# Patient Record
Sex: FEMALE | Race: BLACK OR AFRICAN AMERICAN | Employment: FULL TIME | URBAN - METROPOLITAN AREA
[De-identification: names, ages, dates, MRNs, and addresses within clinical notes are randomized per-mention and may not be internally consistent; named-entity substitution may affect disease eponyms.]

---

## 2017-01-24 ENCOUNTER — OFFICE VISIT (OUTPATIENT)
Dept: INTERNAL MEDICINE CLINIC | Age: 57
End: 2017-01-24

## 2017-01-24 VITALS
OXYGEN SATURATION: 98 % | BODY MASS INDEX: 39.27 KG/M2 | HEIGHT: 67 IN | HEART RATE: 79 BPM | TEMPERATURE: 96 F | SYSTOLIC BLOOD PRESSURE: 143 MMHG | WEIGHT: 250.2 LBS | RESPIRATION RATE: 18 BRPM | DIASTOLIC BLOOD PRESSURE: 73 MMHG

## 2017-01-24 DIAGNOSIS — K62.5 RECTAL BLEEDING: Primary | ICD-10-CM

## 2017-01-24 DIAGNOSIS — E66.09 NON MORBID OBESITY DUE TO EXCESS CALORIES: ICD-10-CM

## 2017-01-24 DIAGNOSIS — I10 ESSENTIAL HYPERTENSION: ICD-10-CM

## 2017-01-24 NOTE — PROGRESS NOTES
SPORTS MEDICINE AND PRIMARY CARE  Leonard Medrano MD, [address] [address]  Phone:  [phone]  Fax: [phone]       Chief Complaint   Patient presents with    Hypertension   . SUBJECTIVE:    Sharifa Menon is a 64 y.o. female Patient returns today alert and appropriate and has the capacity to give an accurate history. She was seen at triage emergency room at [address] on 12/3/16 for rectal bleeding. She was then given an appointment to be seen by the GI clinic. Chemistries were performed and were unremarkable. Hemoglobin was 12.8, hematocrit was 38.5 and platelets were 844,990. PT/INR were normal.  A CT of the abdomen and pelvis was requested. The results are not available. Patient comes in today for follow-up. She has a known history of primary hypertension, colitis noted on colonoscopy 11 and referred to GI, rectal bleeding, and venous insufficiency. Patient shows me pictures of the amount of rectal bleeding. She has some clots as well as bright red bleeding. She feels fine and has no specific complaints except that she is concerned about the medical bills and no pathology is found. In  she did not see a GI specialist.             Current Outpatient Prescriptions   Medication Sig Dispense Refill    metoprolol succinate (TOPROL-XL) 25 mg XL tablet Take 1 Tab by mouth daily. 90 Tab 3    naproxen (NAPROSYN) 500 mg tablet Take 1 Tab by mouth two (2) times daily (with meals). 60 Tab 3    cetirizine (ZYRTEC) 10 mg tablet Take 1 Tab by mouth daily. 30 Tab 11    mometasone (NASONEX) 50 mcg/actuation nasal spray 2 Sprays by Both Nostrils route daily.  1 Container 11     Past Medical History   Diagnosis Date    Bereavement 14     80 yo mother  - aspiration    H/O colonoscopy 11     transversve colon    Hypertension     Obesity     Preventative health care     Rectal bleeding     Rotator cuff arthropathy     Venous insufficiency      History reviewed. No pertinent past surgical history. No Known Allergies      REVIEW OF SYSTEMS:  General: negative for - chills or fever  ENT: negative for - headaches, nasal congestion or tinnitus  Respiratory: negative for - cough, hemoptysis, shortness of breath or wheezing  Cardiovascular : negative for - chest pain, edema, palpitations or shortness of breath  Gastrointestinal: negative for - abdominal pain, blood in stools, heartburn or nausea/vomiting  Genito-Urinary: no dysuria, trouble voiding, or hematuria  Musculoskeletal: negative for - gait disturbance, joint pain, joint stiffness or joint swelling  Neurological: no TIA or stroke symptoms  Hematologic: no bruises, no bleeding, no swollen glands  Integument: no lumps, mole changes, nail changes or rash  Endocrine: no malaise/lethargy or unexpected weight changes      Social History     Social History    Marital status: SINGLE     Spouse name: N/A    Number of children: N/A    Years of education: N/A     Social History Main Topics    Smoking status: Never Smoker    Smokeless tobacco: Never Used    Alcohol use Yes    Drug use: No    Sexual activity: Not Asked     Other Topics Concern    None     Social History Narrative     Family History   Problem Relation Age of Onset    Diabetes Father     Hypertension Father     Stroke Father        OBJECTIVE:    Visit Vitals    /73 (BP 1 Location: Right arm, BP Patient Position: Sitting)    Pulse 79    Temp 96 °F (35.6 °C) (Oral)    Resp 18    Ht 5' 7\" (1.702 m)    Wt 250 lb 3.2 oz (113.5 kg)    SpO2 98%    BMI 39.19 kg/m2     CONSTITUTIONAL: well , well nourished, appears age appropriate  EYES: perrla, eom intact  ENMT:moist mucous membranes, pharynx clear  NECK: supple.  Thyroid normal  RESPIRATORY: Chest: clear bilaterally   CARDIOVASCULAR: Heart: regular rate and rhythm  GASTROINTESTINAL: Abdomen: soft, bowel sounds active  HEMATOLOGIC: no pathological lymph nodes palpated  MUSCULOSKELETAL: Extremities: no edema, pulse 1+   INTEGUMENT: No unusual rashes or suspicious skin lesions noted. Nails appear normal.  NEUROLOGIC: non-focal exam   MENTAL STATUS: alert and oriented, appropriate affect           ASSESSMENT:  1. Rectal bleeding    2. Essential hypertension    3. Non morbid obesity due to excess calories      I am concerned about the rectal bleeding. I suspect it is a flare of her colitis. I think she needs to be seen by gastroenterology for a colonoscopy and treatment of the colitis. She reluctantly agrees. Blood pressure is at the upper limits of normal.      BMI is 39.1 which represents morbid obesity and reflects an eight pound weight gain since we last saw her. I am sure this represents the holidays and she will make adjustments and 30 minutes five days a week activity level. She will return to see us in about six weeks, sooner if she has any further problems. PLAN:  .  Orders Placed This Encounter    REFERRAL TO GASTROENTEROLOGY       Follow-up Disposition:  Return in about 6 weeks (around 3/7/2017). ATTENTION:   This medical record was transcribed using an electronic medical records system. Although proofread, it may and can contain electronic and spelling errors. Other human spelling and other errors may be present. Corrections may be executed at a later time. Please feel free to contact us for any clarifications as needed.

## 2017-01-24 NOTE — PROGRESS NOTES
1. Have you been to the ER, urgent care clinic since your last visit? Hospitalized since your last visit? Yes Where: [facility]    2. Have you seen or consulted any other health care providers outside of the [address] [place] [address] since your last visit? Include any pap smears or colon screening.  Yes Reason for visit: blood in stool

## 2017-01-24 NOTE — MR AVS SNAPSHOT
Visit Information Date & Time Provider Department Dept. Phone Encounter #  
 1/24/2017  3:30 PM Emma Salter MD SPORTS MED AND PRIMARY CARE - [facility] [phone] [id number] Follow-up Instructions Return in about 6 weeks (around 3/7/2017). Follow-up and Disposition History Upcoming Health Maintenance Date Due  
 BREAST CANCER SCRN MAMMOGRAM 4/28/2017 FOBT Q 1 YEAR AGE 50-75 4/12/2017 PAP AKA CERVICAL CYTOLOGY 4/16/2018 DTaP/Tdap/Td series (2 - Td) 1/24/2027 Allergies as of 1/24/2017  Review Complete On: 1/24/2017 By: Emma Salter MD  
 No Known Allergies Current Immunizations  Never Reviewed No immunizations on file. Not reviewed this visit You Were Diagnosed With   
  
 Codes Comments Rectal bleeding    -  Primary ICD-10-CM: K62.5 ICD-9-CM: 569.3 Essential hypertension     ICD-10-CM: I10 
ICD-9-CM: 401.9 Non morbid obesity due to excess calories     ICD-10-CM: E66.09 
ICD-9-CM: 278.00 Vitals BP Pulse Temp Resp Height(growth percentile) Weight(growth percentile) 143/73 (BP 1 Location: Right arm, BP Patient Position: Sitting) 79 96 °F (35.6 °C) (Oral) 18 5' 7\" (1.702 m) 250 lb 3.2 oz (113.5 kg) SpO2 BMI OB Status Smoking Status 98% 39.19 kg/m2 Postmenopausal Never Smoker BMI and BSA Data Body Mass Index Body Surface Area  
 39.19 kg/m 2 2.32 m 2 Preferred Pharmacy Pharmacy Name Phone [id number] [place], [address] [phone] Your Updated Medication List  
  
   
This list is accurate as of: 1/24/17  4:06 PM.  Always use your most recent med list.  
  
  
  
  
 cetirizine 10 mg tablet Commonly known as:  ZYRTEC Take 1 Tab by mouth daily. metoprolol succinate 25 mg XL tablet Commonly known as:  TOPROL-XL Take 1 Tab by mouth daily. mometasone 50 mcg/actuation nasal spray Commonly known as:  NASONEX  
2 Sprays by Both Nostrils route daily. naproxen 500 mg tablet Commonly known as:  NAPROSYN Take 1 Tab by mouth two (2) times daily (with meals). We Performed the Following REFERRAL TO GASTROENTEROLOGY [ENF90 Custom] Follow-up Instructions Return in about 6 weeks (around 3/7/2017). Referral Information Referral ID Referred By Referred To  
  
 [id number] Shirley Barth MD   
   [facility], [address] Phone: [phone] Fax: [phone] Visits Status Start Date End Date 1 New Request 1/24/17 1/24/18 If your referral has a status of pending review or denied, additional information will be sent to support the outcome of this decision. Introducing [facility]! Teresa Gary introduces [employer] patient portal. Now you can access parts of your medical record, email your doctor's office, and request medication refills online. 1. In your internet browser, go to [email or web address] 2. Click on the First Time User? Click Here link in the Sign In box. You will see the New Member Sign Up page. 3. Enter your [employer] Access Code exactly as it appears below. You will not need to use this code after youve completed the sign-up process. If you do not sign up before the expiration date, you must request a new code. · [facility] Access Code: [email or web address] Expires: 4/24/2017  4:06 PM 
 
4. Enter the last four digits of your Social Security Number (xxxx) and Date of Birth (mm/dd/yyyy) as indicated and click Submit. You will be taken to the next sign-up page. 5. Create a [employer] ID. This will be your [facility] login ID and cannot be changed, so think of one that is secure and easy to remember. 6. Create a [employer] password. You can change your password at any time. 7. Enter your Password Reset Question and Answer.  This can be used at a later time if you forget your password. 8. Enter your e-mail address. You will receive e-mail notification when new information is available in [address] 9. Click Sign Up. You can now view and download portions of your medical record. 10. Click the Download Summary menu link to download a portable copy of your medical information. If you have questions, please visit the Frequently Asked Questions section of the [employer] website. Remember, [employer] is NOT to be used for urgent needs. For medical emergencies, dial 911. Now available from your iPhone and Android! Please provide this summary of care documentation to your next provider. Your primary care clinician is listed as Joey Guerra. If you have any questions after today's visit, please call [phone].

## 2017-01-25 ENCOUNTER — TELEPHONE (OUTPATIENT)
Dept: INTERNAL MEDICINE CLINIC | Age: 57
End: 2017-01-25

## 2017-03-23 ENCOUNTER — ANESTHESIA EVENT (OUTPATIENT)
Dept: SURGERY | Age: 57
End: 2017-03-23
Payer: COMMERCIAL

## 2017-03-28 ENCOUNTER — SURGERY (OUTPATIENT)
Age: 57
End: 2017-03-28

## 2017-03-28 ENCOUNTER — ANESTHESIA (OUTPATIENT)
Dept: SURGERY | Age: 57
End: 2017-03-28
Payer: COMMERCIAL

## 2017-03-28 ENCOUNTER — HOSPITAL ENCOUNTER (OUTPATIENT)
Age: 57
Setting detail: OUTPATIENT SURGERY
Discharge: HOME | End: 2017-03-28
Attending: INTERNAL MEDICINE | Admitting: INTERNAL MEDICINE
Payer: COMMERCIAL

## 2017-03-28 VITALS
HEIGHT: 67 IN | HEART RATE: 68 BPM | WEIGHT: 250 LBS | SYSTOLIC BLOOD PRESSURE: 135 MMHG | BODY MASS INDEX: 39.24 KG/M2 | DIASTOLIC BLOOD PRESSURE: 78 MMHG | RESPIRATION RATE: 16 BRPM | TEMPERATURE: 97.5 F | OXYGEN SATURATION: 98 %

## 2017-03-28 LAB
GLUCOSE BLD STRIP.AUTO-MCNC: 107 MG/DL (ref 65–100)
SERVICE CMNT-IMP: ABNORMAL

## 2017-03-28 PROCEDURE — 76060000031 HC ANESTHESIA FIRST 0.5 HR: Performed by: INTERNAL MEDICINE

## 2017-03-28 PROCEDURE — 82962 GLUCOSE BLOOD TEST: CPT

## 2017-03-28 PROCEDURE — 88342 IMHCHEM/IMCYTCHM 1ST ANTB: CPT | Performed by: INTERNAL MEDICINE

## 2017-03-28 PROCEDURE — 74011000250 HC RX REV CODE- 250

## 2017-03-28 PROCEDURE — 74011000250 HC RX REV CODE- 250: Performed by: INTERNAL MEDICINE

## 2017-03-28 PROCEDURE — 74011250636 HC RX REV CODE- 250/636

## 2017-03-28 PROCEDURE — 88305 TISSUE EXAM BY PATHOLOGIST: CPT | Performed by: INTERNAL MEDICINE

## 2017-03-28 PROCEDURE — 77030019988 HC FCPS ENDOSC DISP BSC -B: Performed by: INTERNAL MEDICINE

## 2017-03-28 PROCEDURE — 76040000019: Performed by: INTERNAL MEDICINE

## 2017-03-28 RX ORDER — NALOXONE HYDROCHLORIDE 0.4 MG/ML
0.4 INJECTION, SOLUTION INTRAMUSCULAR; INTRAVENOUS; SUBCUTANEOUS
Status: DISCONTINUED | OUTPATIENT
Start: 2017-03-28 | End: 2017-03-28 | Stop reason: HOSPADM

## 2017-03-28 RX ORDER — LIDOCAINE HYDROCHLORIDE 20 MG/ML
5 SOLUTION OROPHARYNGEAL AS NEEDED
Status: DISCONTINUED | OUTPATIENT
Start: 2017-03-28 | End: 2017-03-28 | Stop reason: HOSPADM

## 2017-03-28 RX ORDER — LIDOCAINE HYDROCHLORIDE 20 MG/ML
INJECTION, SOLUTION INFILTRATION; PERINEURAL AS NEEDED
Status: DISCONTINUED | OUTPATIENT
Start: 2017-03-28 | End: 2017-03-28 | Stop reason: HOSPADM

## 2017-03-28 RX ORDER — FENTANYL CITRATE 50 UG/ML
100 INJECTION, SOLUTION INTRAMUSCULAR; INTRAVENOUS ONCE
Status: DISCONTINUED | OUTPATIENT
Start: 2017-03-28 | End: 2017-03-28 | Stop reason: HOSPADM

## 2017-03-28 RX ORDER — DEXTROSE MONOHYDRATE AND SODIUM CHLORIDE 5; .9 G/100ML; G/100ML
100 INJECTION, SOLUTION INTRAVENOUS CONTINUOUS
Status: DISCONTINUED | OUTPATIENT
Start: 2017-03-28 | End: 2017-03-28 | Stop reason: HOSPADM

## 2017-03-28 RX ORDER — SODIUM CHLORIDE 0.9 % (FLUSH) 0.9 %
5-10 SYRINGE (ML) INJECTION AS NEEDED
Status: DISCONTINUED | OUTPATIENT
Start: 2017-03-28 | End: 2017-03-28 | Stop reason: HOSPADM

## 2017-03-28 RX ORDER — SODIUM CHLORIDE 0.9 % (FLUSH) 0.9 %
5-10 SYRINGE (ML) INJECTION EVERY 8 HOURS
Status: DISCONTINUED | OUTPATIENT
Start: 2017-03-28 | End: 2017-03-28 | Stop reason: HOSPADM

## 2017-03-28 RX ORDER — PROPOFOL 10 MG/ML
INJECTION, EMULSION INTRAVENOUS AS NEEDED
Status: DISCONTINUED | OUTPATIENT
Start: 2017-03-28 | End: 2017-03-28 | Stop reason: HOSPADM

## 2017-03-28 RX ORDER — SODIUM CHLORIDE, SODIUM LACTATE, POTASSIUM CHLORIDE, CALCIUM CHLORIDE 600; 310; 30; 20 MG/100ML; MG/100ML; MG/100ML; MG/100ML
INJECTION, SOLUTION INTRAVENOUS
Status: DISCONTINUED | OUTPATIENT
Start: 2017-03-28 | End: 2017-03-28 | Stop reason: HOSPADM

## 2017-03-28 RX ORDER — ATROPINE SULFATE 0.1 MG/ML
0.5 INJECTION INTRAVENOUS
Status: DISCONTINUED | OUTPATIENT
Start: 2017-03-28 | End: 2017-03-28 | Stop reason: HOSPADM

## 2017-03-28 RX ORDER — DIPHENHYDRAMINE HYDROCHLORIDE 50 MG/ML
50 INJECTION, SOLUTION INTRAMUSCULAR; INTRAVENOUS ONCE
Status: DISCONTINUED | OUTPATIENT
Start: 2017-03-28 | End: 2017-03-28 | Stop reason: HOSPADM

## 2017-03-28 RX ORDER — FLUMAZENIL 0.1 MG/ML
0.2 INJECTION INTRAVENOUS
Status: DISCONTINUED | OUTPATIENT
Start: 2017-03-28 | End: 2017-03-28 | Stop reason: HOSPADM

## 2017-03-28 RX ORDER — EPINEPHRINE 0.1 MG/ML
1 INJECTION INTRACARDIAC; INTRAVENOUS
Status: DISCONTINUED | OUTPATIENT
Start: 2017-03-28 | End: 2017-03-28 | Stop reason: HOSPADM

## 2017-03-28 RX ORDER — SODIUM CHLORIDE 9 MG/ML
100 INJECTION, SOLUTION INTRAVENOUS CONTINUOUS
Status: DISCONTINUED | OUTPATIENT
Start: 2017-03-28 | End: 2017-03-28 | Stop reason: HOSPADM

## 2017-03-28 RX ORDER — DEXTROMETHORPHAN/PSEUDOEPHED 2.5-7.5/.8
1.2 DROPS ORAL
Status: DISCONTINUED | OUTPATIENT
Start: 2017-03-28 | End: 2017-03-28 | Stop reason: HOSPADM

## 2017-03-28 RX ORDER — LORAZEPAM 2 MG/ML
2 INJECTION INTRAMUSCULAR AS NEEDED
Status: DISCONTINUED | OUTPATIENT
Start: 2017-03-28 | End: 2017-03-28 | Stop reason: HOSPADM

## 2017-03-28 RX ORDER — MIDAZOLAM HYDROCHLORIDE 1 MG/ML
5 INJECTION, SOLUTION INTRAMUSCULAR; INTRAVENOUS
Status: DISCONTINUED | OUTPATIENT
Start: 2017-03-28 | End: 2017-03-28 | Stop reason: HOSPADM

## 2017-03-28 RX ADMIN — SODIUM CHLORIDE, SODIUM LACTATE, POTASSIUM CHLORIDE, CALCIUM CHLORIDE: 600; 310; 30; 20 INJECTION, SOLUTION INTRAVENOUS at 09:00

## 2017-03-28 RX ADMIN — PROPOFOL 10 MG: 10 INJECTION, EMULSION INTRAVENOUS at 10:36

## 2017-03-28 RX ADMIN — PROPOFOL 20 MG: 10 INJECTION, EMULSION INTRAVENOUS at 10:33

## 2017-03-28 RX ADMIN — PROPOFOL 10 MG: 10 INJECTION, EMULSION INTRAVENOUS at 10:34

## 2017-03-28 RX ADMIN — BENZOCAINE 1 SPRAY: 200 SPRAY DENTAL; ORAL; PERIODONTAL at 10:29

## 2017-03-28 RX ADMIN — PROPOFOL 20 MG: 10 INJECTION, EMULSION INTRAVENOUS at 10:32

## 2017-03-28 RX ADMIN — PROPOFOL 10 MG: 10 INJECTION, EMULSION INTRAVENOUS at 10:35

## 2017-03-28 RX ADMIN — PROPOFOL 100 MG: 10 INJECTION, EMULSION INTRAVENOUS at 10:30

## 2017-03-28 RX ADMIN — PROPOFOL 10 MG: 10 INJECTION, EMULSION INTRAVENOUS at 10:39

## 2017-03-28 RX ADMIN — PROPOFOL 10 MG: 10 INJECTION, EMULSION INTRAVENOUS at 10:38

## 2017-03-28 RX ADMIN — PROPOFOL 10 MG: 10 INJECTION, EMULSION INTRAVENOUS at 10:37

## 2017-03-28 RX ADMIN — PROPOFOL 20 MG: 10 INJECTION, EMULSION INTRAVENOUS at 10:31

## 2017-03-28 RX ADMIN — LIDOCAINE HYDROCHLORIDE 60 MG: 20 INJECTION, SOLUTION INFILTRATION; PERINEURAL at 10:30

## 2017-03-28 RX ADMIN — PROPOFOL 10 MG: 10 INJECTION, EMULSION INTRAVENOUS at 10:40

## 2017-03-28 NOTE — PROCEDURES
G I Procedure Note                         EGD    Dr. Vania Farias office   [address]                              [id number]                                 [id number]   1960                       64 y.o.                female        Procedure Date: 3/28/2017   Procedure  EGD  with biopsy. Pre Op Diagnosis:                     1. GERD , ABDOMINAL PAIN   RECTAL BLEEDING                                                                                                                                                                           Post Op Diagnosis:                   1.   Mild Gastritis, Duodenal Ulcer, 3cm iatal Hernia,                                                            H&p completed  Yes    Anesthesia Assessment Performed prior to procedure:No change   Medications Medication Record            Description of Procedure:  Dolores Jewell  was seen in the endoscopy suite and the pre procedure evaluation was completed. The patient was identified as Dolores Jewell  and the procedure verified as EGD with biopsy. A Time Out was held and the above information confirmed. The risks, benefits, complications, treatment options and expected outcomes were discussed with the patient. The possibilities of reaction to medication, pulmonary aspiration, perforation of a viscus, bleeding, failure to diagnose a condition and creating a complication requiring transfusion or operation were discussed with the patient who  Permit obtained and risks explained ( [address])     Procedure Note:  With the patient in the left lateral position and after appropriate conscious anesthesia the Olympus Video endoscope was passed under direct vision into the oropharynx.   The oropharynx appeared Normal   The instrument was advanced into the esophagus and the findings were a normal appearing esophageal mucosa without other anatomic abnormalities. The instrument was advanced into the stomach through the esophagogastric junction. The gastroscope was advanced progressively through the stomach visualizing the body and antral areas. The findings were a moderate gastritis with erosions. A biopsy was obtained. The instrument was further advanced through the pylorus into the duodenum. The bulb of the duodenum and the second portion of the duodenum were examined and the findings were a linear  Superficial ulcer in the bulb and a smooth appearing duodenal mucosa with mild erythema. The endoscope was withdrawn back into the stomach and retroflexed with examination of the fundus and cardia and the findings were no additional abnormalities . The instrument was withdrawn back into the esophagus and the the finding were no additional abnormalities    Biopsies were obtained for helicobacter testing and pathologic analysis from the representative areas. The endoscope was completely withdrawn and the patient tolerated the procedure well. 1.  Blood loss was nominal.  2.  For biopsy  Specimen verification by physician and nurse two sources name,           social security numbers     Suggestions  Plan 1. - Acid suppression with a proton pump inhibitor.  - Await pathology. - Await DAMIAN test result and treat for Helicobacter pylori if positive. Angella Rizo MD                                                                                                                        G I Procedure Note              COLONOSCOPY   Dr. Ibrahima Copeland office   [facility] Office    [address] [place] [address] [address]                                   [id number]                                  [id number]   1960                                      64 y.o.                                    female Procedure Date: 3/28/2017                                                                                                              Pre Op Diagnosis:                     1. GERD , ABDOMINAL PAIN   RECTAL BLEEDING                                                                                                                                                                         Post Op Diagnosis:                   1.   Diverticulosis,   Hemorrhoids   Mild nonspecific edema                                                                H&p completed: Yes            Anesthesia Assessment: Performed prior to procedure:      No change  Anesthesia Plan: Performed prior to procedure:                   No change       Medications: See Reviewed List and Reconcilation           Informed consent was obtained     Risk Statement:  Prior to the procedure the risks were explained to the patient and/or to the family including but not limited to perforation, bleeding, adverse drug reaction, aspiration, and even the need for possible surgery. A colonoscopy exam is not 100% accurate which may be related to preparation or blind spots during the exam.The possibility that an abnormality and /or cancer could be missed was also discussed as well as alternative x-ray options. Instrument:    Olympus adult Videocolonoscope                                   Immediate Procedure Reassessment Completed     With the patient in the left lateral position, a rectal examination was performed and the findings were:negative, stool guaiac negative. The Olympus Video colonoscope was inserted under direct vision into the rectum. The colonoscope was passed from the rectum to the cecum, which was identified by the ileocecal valve. The colon findings demonstrated:    ANUS: Anal exam reveals no masses or hemorrhoids, sphincter tone is normal.     RECTUM: Rectal exam reveals no masses or hemorrhoids.      SIGMOID COLON: mild nonspecific edema The patient was noted to have diverticulosis. The mucosa is normal with good vascular pattern and without ulcers or polyps. DESCENDING COLON:  mild nonspecific edemaThe mucosa is normal with good vascular pattern and without ulcers or polyps. SPLENIC FLEXURE: The splenic flexure is normal.     TRANSVERSE COLON: The mucosa is normal with good vascular pattern and without ulcers or polyps. HEPATIC FLEXURE: The hepatic flexure is normal.     ASCENDING COLON: The mucosa is normal with good vascular pattern and without ulcers or polyps. CECUM:  The ileocecal valve appears normal.      rare diverticulosis was noted. A b     The colonoscope was slowly withdrawn >6 minute period and the instrument was retroflexed in the rectum. The rectal findings were:Protruding lesions:     -Internal Hemorrhoids  The patient tolerated the entire procedure well. Blood Loss minimal nominal    Colon preparation was good    Recommendations:     - Repeat colonoscopy in 5 years.      - recheck fe level , capsule endoscopy study, discuss with dr Manfred Negrete pcp        Discuss edema with attending md        Stools studies c diff , wbcs, c&s       Copies sent to   Hemant Villarreal MD  CC:  Candice Granados MD

## 2017-03-28 NOTE — IP AVS SNAPSHOT
Euell Idol 
 
 
 [address] Patient: Sohail Paz MRN: [id number] You are allergic to the following No active allergies Recent Documentation Height Weight Breastfeeding? BMI OB Status Smoking Status 1.702 m 113.4 kg No 39.16 kg/m2 Postmenopausal Never Smoker Emergency Contacts Name Discharge Info Relation Home Work Mobile Jennifer Silva CAREGIVER [3]  [phone] About your hospitalization You were admitted on:  March 28, 2017 You last received care in the:  [facility] PACU/[facility] You were discharged on:  March 28, 2017 Unit phone number:  [phone] Why you were hospitalized Your primary diagnosis was:  Not on File Providers Seen During Your Hospitalizations Provider Role Specialty Primary office phone Eulalia Membreno MD Attending Provider Internal Medicine [phone] Your Primary Care Physician (PCP) Primary Care Physician Office Phone Office Fax [place] Julissa [phone] Follow-up Information Follow up With Details Comments Contact Info Momo Hook MD   [facility] [address] [place] [address] 
[phone] Current Discharge Medication List  
  
CONTINUE these medications which have NOT CHANGED Dose & Instructions Dispensing Information Comments Morning Noon Evening Bedtime  
 cetirizine 10 mg tablet Commonly known as:  ZYRTEC Your last dose was: Your next dose is:    
   
   
 Dose:  10 mg Take 1 Tab by mouth daily. Quantity:  30 Tab Refills:  11  
     
   
   
   
  
 metoprolol succinate 25 mg XL tablet Commonly known as:  TOPROL-XL Your last dose was: Your next dose is:    
   
   
 Dose:  25 mg Take 1 Tab by mouth daily. Quantity:  90 Tab Refills:  3 mometasone 50 mcg/actuation nasal spray Commonly known as:  Fabricio Pelt Your last dose was: Your next dose is:    
   
   
 Dose:  2 Spray 2 Sprays by Both Nostrils route daily. Quantity:  1 Container Refills:  11 STOP taking these medications   
 naproxen 500 mg tablet Commonly known as:  NAPROSYN Discharge Instructions Endoscopy Discharge Instructions Dr. Bruno Goodell Powers office [phone] [facility] [place] Office     NAME: Sammi Ramirez RECORD [id number] PATIENT SS#[id number] YOB: 1960 SEX:  female AGE:  64 y.o. FINAL Discharge Procedure and Diagnosis:   
  
Procedure(s): 
COLONOSCOPY / EGD  
ESOPHAGOGASTRODUODENOSCOPY (EGD) FINDINGS:    
Ulcer Diverticulosis  
hemorrhoids MEDICATIONS CONTINUE CURRENT MEDICATIONS 
 
  NEW MEDICATIONS 1.  protonix 40 mg daily 2.  
 3.  
 
 
 
  
  
 
  
 
Testing Schedule Colonoscopy Screening                                   Recommendations Repeat colonoscopy in 3 years Repeat colonoscopy in 5 years Repeat colonoscopy in 10 years New additional 
Tests Call the office  
([phone]) for the appointment time    
  capsule endoscopy study after discussion with dr Eddie Abraham YOUR NEXT APPOINTMENT WITH DR Kyree Burrows: in  4 week(s).                Celeste Guidry MD  
                 
 
 
                     
                                     
   If you had a colonoscopy the \"C\" indicates specific instructions    
  
x  Ordinarily you may resume your previous diet but your initial diet should be   light. Large meals can cause abdominal discomfort after these procedures. Specific Diet Recommendations:  
    
      High fiber diet.,  
 
     GERD diet: avoid fried and fatty foods, peppermint, chocolate, alcohol,    
          coffee, citrus fruits and juices, and tomato products. Avoid lying down for 2           to 3  hours after eating.       
            
__x__  You may feel quite tired and need to rest and recuperate for several hours    following these procedures. __x__  Due to the fact that sedation was administered for this procedure, do not drive,   operate machinery or sign legal documents for the next 24 hours. __x__  Mild abdominal pain may be experienced after your procedure, but is should   disappear after several hours. Notify your physician if you have persistent pain,   tenderness or abdominal distension. __x__  C Many patients for the first few hours following the exam may experience        belching or passing gas through the rectum. Walking may help to relieve      
 distention and gas pains. A warm bath or shower will often help with abdominal  cramping.                                                                                        
  
__x__   Brian Friedman may return to your normal routine tomorrow, according to how you feel        and depending on your doctors instructions. Be sure to call your doctor to make  an appointment for a post-surgery check-up on the date your doctor has   requested. __x__ C Rectal bleeding or spotting in small amounts may occur with the first bowel   movement following a colonoscopy or sigmoidoscopy. __x__  Rondazana Alar may experience a numbness or lack of sensation in throat. If present, do not   
 eat or drink.  Before eating, test your ability to drink with small sips of water. Y     You may try clear liquids or soups. If you tolerate these, you may then eat solid     food which is not greasy or spicy. __x__ C   
 IF POLYPS REMOVED: Avoid any blood thinning medication such as plavix,   aspirin or coumadin  NSAIDS (like advil or alleve) for 7 days. __x__  Notify your physician if you cough or vomit blood or experience chest pain. Your biopsy or testing result should be available in 7-10 days Prescription will be electronically sent to your pharmacy you must  
  let your nurse know your pharmacy:  
                                                                                                                               
 
 
     [address] TO HELP ENSURE A SMOOTH RECOVERY,  
    IT IS IMPORTANT TO FOLLOW THEM. _x___Pamphlet /Educational Information provided for diagnostic findings Additional education information can assessed at the sites below: 
 [employer] 
 http://www.digestive. niddk.nih.gov/ddiseases/a-z.asp Web MD patient information Signature of individual giving instruction : 
 Date: 3/28/2017 Discharge Orders None Introducing [facility]! Emile Anderson introduces [employer] patient portal. Now you can access parts of your medical record, email your doctor's office, and request medication refills online. 1. In your internet browser, go to [email or web address]://[email or web address]. Aframe/[employer] 2. Click on the First Time User? Click Here link in the Sign In box. You will see the New Member Sign Up page. 3. Enter your [employer] Access Code exactly as it appears below. You will not need to use this code after youve completed the sign-up process. If you do not sign up before the expiration date, you must request a new code. · [email or web address] Access Code: [email or web address] Expires: 4/24/2017  5:06 PM 
 
4. Enter the last four digits of your Social Security Number (xxxx) and Date of Birth (mm/dd/yyyy) as indicated and click Submit. You will be taken to the next sign-up page. 5. Create a [employer] ID. This will be your [employer] login ID and cannot be changed, so think of one that is secure and easy to remember. 6. Create a [employer] password. You can change your password at any time. 7. Enter your Password Reset Question and Answer. This can be used at a later time if you forget your password. 8. Enter your e-mail address. You will receive e-mail notification when new information is available in [address] 9. Click Sign Up. You can now view and download portions of your medical record. 10. Click the Download Summary menu link to download a portable copy of your medical information. If you have questions, please visit the Frequently Asked Questions section of the [employer] website. Remember, [employer] is NOT to be used for urgent needs. For medical emergencies, dial 911. Now available from your iPhone and Android! General Information Please provide this summary of care documentation to your next provider. Patient Signature:  ____________________________________________________________ Date:  ____________________________________________________________  
  
Aloma Snellen Provider Signature:  ____________________________________________________________ Date:  ____________________________________________________________

## 2017-03-28 NOTE — ANESTHESIA PREPROCEDURE EVALUATION
Anesthetic History   No history of anesthetic complications            Review of Systems / Medical History  Patient summary reviewed and pertinent labs reviewed    Pulmonary  Within defined limits                 Neuro/Psych   Within defined limits           Cardiovascular    Hypertension              Exercise tolerance: >4 METS     GI/Hepatic/Renal  Within defined limits              Endo/Other        Morbid obesity     Other Findings              Physical Exam    Airway  Mallampati: II  TM Distance: 4 - 6 cm  Neck ROM: decreased range of motion   Mouth opening: Normal     Cardiovascular    Rhythm: regular  Rate: normal         Dental    Dentition: Lower dentition intact and Upper dentition intact     Pulmonary  Breath sounds clear to auscultation               Abdominal  GI exam deferred       Other Findings            Anesthetic Plan    ASA: 2  Anesthesia type: MAC            Anesthetic plan and risks discussed with: Patient

## 2017-03-28 NOTE — DISCHARGE INSTRUCTIONS
Endoscopy Discharge Instructions     Dr. Alexsandra Yates office   [facility]    [phone]                                        NAME: Robert Hay [id number]   PATIENT SS#[id number] YOB: 1960   SEX:  female AGE:  64 y.o. FINAL Discharge Procedure and Diagnosis:       Procedure(s):  COLONOSCOPY / EGD   ESOPHAGOGASTRODUODENOSCOPY (EGD)       FINDINGS:     Ulcer   Diverticulosis   hemorrhoids                                            MEDICATIONS    [x] CONTINUE CURRENT MEDICATIONS     [] NEW MEDICATIONS           1.  protonix 40 mg daily    2.    3.                      Testing   Schedule              Colonoscopy Screening                                   Recommendations   []  Repeat colonoscopy in 3 years   [x]  Repeat colonoscopy in 5 years   []  Repeat colonoscopy in 10 years      New additional  Tests  Call the office   ([phone]) for the appointment time      [x] capsule endoscopy study after discussion with dr Manfred Negrete     []      []                [address]: in  4 week(s). Dwayne Amaral MD                                                                                        If you had a colonoscopy the \"C\" indicates specific instructions        x  Ordinarily you may resume your previous diet but your initial diet should be   light. Large meals can cause abdominal discomfort after these procedures. Specific Diet Recommendations:             [x] High fiber diet.,         [x] GERD diet: avoid fried and fatty foods, peppermint, chocolate, alcohol,               coffee, citrus fruits and juices, and tomato products.  Avoid lying down for 2           to 3  hours after eating.                     __x__  You may feel quite tired and need to rest and recuperate for several hours    following these procedures. __x__  Due to the fact that sedation was administered for this procedure, do not drive,   operate machinery or sign legal documents for the next 24 hours. __x__  Mild abdominal pain may be experienced after your procedure, but is should   disappear after several hours. Notify your physician if you have persistent pain,   tenderness or abdominal distension. __x__  C    Many patients for the first few hours following the exam may experience        belching or passing gas through the rectum. Walking may help to relieve        distention and gas pains. A warm bath or shower will often help with abdominal  cramping.                                                                                            __x__   Delores Villagran may return to your normal routine tomorrow, according to how you feel        and depending on your doctors instructions. Be sure to call your doctor to make  an appointment for a post-surgery check-up on the date your doctor has   requested. __x__ C     Rectal bleeding or spotting in small amounts may occur with the first bowel   movement following a colonoscopy or sigmoidoscopy. __x__  Delores Villagran may experience a numbness or lack of sensation in throat. If present, do not     eat or drink. Before eating, test your ability to drink with small sips of water. Y     You may try clear liquids or soups. If you tolerate these, you may then eat solid     food which is not greasy or spicy. __x__ C     IF POLYPS REMOVED: Avoid any blood thinning medication such as plavix,   aspirin or coumadin  NSAIDS (like advil or alleve) for 7 days. __x__  Notify your physician if you cough or vomit blood or experience chest pain.            Your biopsy or testing result should be available in 7-10 days                                                                                                                        Prescription will be electronically sent to your pharmacy you must     let your nurse know your pharmacy:                                                                                                                                            [address] [place] [address]. TO HELP ENSURE A SMOOTH RECOVERY,       IT IS IMPORTANT TO FOLLOW THEM. _x___Pamphlet /Educational Information provided for diagnostic findings     Additional education information can assessed at the sites below:   [employer]   http://www.digestive. niddk.nih.gov/ddiseases/a-z.asp      Web MD patient information                                                                                                Signature of individual giving instruction :   Date: 3/28/2017

## 2017-03-28 NOTE — PERIOP NOTES
Pt educated on discharge instructions by Dr. Rachell Martins and RN staff. Pt verbalized understanding and provided with opportunity to ask questions. Pt given written materials.

## 2017-03-28 NOTE — IP AVS SNAPSHOT
Current Discharge Medication List  
  
CONTINUE these medications which have NOT CHANGED Dose & Instructions Dispensing Information Comments Morning Noon Evening Bedtime  
 cetirizine 10 mg tablet Commonly known as:  ZYRTEC Your last dose was: Your next dose is:    
   
   
 Dose:  10 mg Take 1 Tab by mouth daily. Quantity:  30 Tab Refills:  11  
     
   
   
   
  
 metoprolol succinate 25 mg XL tablet Commonly known as:  TOPROL-XL Your last dose was: Your next dose is:    
   
   
 Dose:  25 mg Take 1 Tab by mouth daily. Quantity:  90 Tab Refills:  3  
     
   
   
   
  
 mometasone 50 mcg/actuation nasal spray Commonly known as:  Lauren Garrick Your last dose was: Your next dose is:    
   
   
 Dose:  2 Spray 2 Sprays by Both Nostrils route daily. Quantity:  1 Container Refills:  11 STOP taking these medications   
 naproxen 500 mg tablet Commonly known as:  NAPROSYN

## 2017-03-28 NOTE — ANESTHESIA POSTPROCEDURE EVALUATION
Post-Anesthesia Evaluation and Assessment    Patient: Cm Arellano MRN: [id number]  SSN: [id number]    YOB: 1960  Age: 64 y.o. Sex: female       Cardiovascular Function/Vital Signs  Visit Vitals    /78    Pulse 68    Temp 36.4 °C (97.5 °F)    Resp 16    Ht 5' 7\" (1.702 m)    Wt 113.4 kg (250 lb)    SpO2 98%    Breastfeeding No    BMI 39.16 kg/m2       Patient is status post MAC anesthesia for Procedure(s):  COLONOSCOPY   ESOPHAGOGASTRODUODENOSCOPY (EGD)  ESOPHAGOGASTRODUODENAL (EGD) BIOPSY. Nausea/Vomiting: None    Postoperative hydration reviewed and adequate. Pain:  Pain Scale 1: Numeric (0 - 10) (03/28/17 1105)  Pain Intensity 1: 0 (03/28/17 1105)   Managed    Neurological Status:   Neuro (WDL): Within Defined Limits (03/28/17 1105)  Neuro  Neurologic State: Alert; Appropriate for age (03/28/17 1105)  LUE Motor Response: Purposeful (03/28/17 1105)  LLE Motor Response: Purposeful (03/28/17 1105)  RUE Motor Response: Purposeful (03/28/17 1105)  RLE Motor Response: Purposeful (03/28/17 1105)   At baseline    Mental Status and Level of Consciousness: Arousable    Pulmonary Status:   O2 Device: Room air (03/28/17 1105)   Adequate oxygenation and airway patent    Complications related to anesthesia: None    Post-anesthesia assessment completed.  No concerns    Signed By: Elizabeth Tim MD     March 28, 2017

## 2017-03-28 NOTE — H&P
G I Procedure Note           Endoscopy History and Physical               Dr. Alexander  [address] [place] [address] [id number]  [id number]    1960  64 y.o.  female      Date of Procedure:   Preoperative Diagnosis:       Procedure:    3/28/2017           GERD   ABDOMINAL PAIN   RECTAL BLEEDING                               Procedure(s):  COLONOSCOPY / EGD   ESOPHAGOGASTRODUODENOSCOPY (EGD)      Gastroenterologist:  Anesthesia:           Eulalia Membreno MD                               MAC            History and procedure indication:  Sohail Paz is a 64 y.o. BLACK OR  female who presents with: GERD   ABDOMINAL PAIN   RECTAL BLEEDING    including the additional history of Altered Bowel Habits, Gastrointestinal Bleeding and Nausea ,Rectal bleeding,Abdominal pain, epigastric,  Abdominal pain, LLQ      Past Medical History:   Diagnosis Date    Bereavement 14    80 yo mother  - aspiration    H/O colonoscopy 11    transversve colon    Hypertension     Obesity     Preventative health care     Rectal bleeding     Rotator cuff arthropathy     Venous insufficiency       Prior to Admission medications    Medication Sig Start Date End Date Taking? Authorizing Provider   metoprolol succinate (TOPROL-XL) 25 mg XL tablet Take 1 Tab by mouth daily. 16   Momo Hook MD   naproxen (NAPROSYN) 500 mg tablet Take 1 Tab by mouth two (2) times daily (with meals). 16   Momo Hook MD   cetirizine (ZYRTEC) 10 mg tablet Take 1 Tab by mouth daily. 16   Momo Hook MD   mometasone (NASONEX) 50 mcg/actuation nasal spray 2 Sprays by Both Nostrils route daily. 16   Momo Hook MD     No Known Allergies    No past surgical history on file.   Family History   Problem Relation Age of Onset    Diabetes Father    [facility] Hypertension Father     Stroke Father       Social History   Substance Use Topics    Smoking status: Never Smoker    Smokeless tobacco: Never Used    Alcohol use Yes                                                      PHYSICAL EXAM   There were no vitals taken for this visit. General appearance:  alert, well appearing, and in no distress  Mental status:  normal mood, behavior, speech, dress, motor activity and thought processes  Nose:      normal and patent, no erythema, discharge or polyps  Mouth:- mucous membranes moist, pharynx normal without lesions                  [x]  No Loose teeth      []    Loose teeth  Finger opening:  []1     []1.5    [] 2     [] 2.5     [x] 3      [] 3.5     [] 4   Mallampati:         [] Class 1     [x] Class 2    [] Class 3      [] Class 4      Neck - supple,      [x] Full ROM [] Decreased ROM  [] Short Neck no significant adenopathy    Chest - clear to auscultation, no wheezes, rales or rhonchi, symmetric air entry  Heart: normal rate, regular rhythm, normal S1, S2, no murmurs, rubs, clicks or gallops  Abdomen: abdomen soft, bowel sounds  [x] normal  [] increased  [] hypoactive                       [] no tenderness  [] epigastric tenderness  [] LLQ tenderness   [] RLQ tenderness                      No masses, organomegaly or guarding. Rectal exam: negative without mass, lesions or tenderness  Extremities: peripheral pulses normal, no pedal edema, no clubbing or cyanosis  Neurologic: Alert and oriented to person, place, and time; normal strength and tone.                          Normal symmetric reflexes  Normal gait:                                      Assessement:                                 Pre op dx:  GERD   ABDOMINAL PAIN   RECTAL BLEEDING    Additional medical problems list below   Patient Active Problem List   Diagnosis Code    Hypertension I10    Venous insufficiency I87.2    H/O colonoscopy Z98.890    Rectal bleeding K62.5    Obesity E66.9    Rotator cuff arthropathy M12.819    Preventative health care Z00.00                                                                                         This note documentation was performed prior to this planned procedure       after a history and physical was performed in the office. Date: 3 15 17                   Pre Procedure Evaluation (per anesthesia or per h&p)                                                Sedation/Assessment:                                                                                               Mallampati Classification                            []Class 1                    []Class 2                    [] Class 3                  [] Class 4                                              ASA classfication         []     Class I: Normally healthy         []     Class II: Patient with mild systemic disease (e.g. hypertension)         []     Class III: Patient with severe systemic disease (e.g. CHF), non-decompensated         []     Class IV: Patient with severe systemic disease, decompensated         []     Class V: Moribund patient, survival unlikely                     Plan:  [x]  Egd                                 [x] Colonoscopy                               [] with Moderate Sedation /Conscious Sedation                                 [x] MAC          Patient stable for planned procedure. See orders.      Marisa Hall MD

## 2017-08-10 ENCOUNTER — OFFICE VISIT (OUTPATIENT)
Dept: INTERNAL MEDICINE CLINIC | Age: 57
End: 2017-08-10

## 2017-08-10 VITALS
HEART RATE: 70 BPM | BODY MASS INDEX: 37.67 KG/M2 | TEMPERATURE: 97.4 F | OXYGEN SATURATION: 99 % | DIASTOLIC BLOOD PRESSURE: 75 MMHG | WEIGHT: 240 LBS | RESPIRATION RATE: 16 BRPM | HEIGHT: 67 IN | SYSTOLIC BLOOD PRESSURE: 143 MMHG

## 2017-08-10 DIAGNOSIS — Z98.890 S/P COLONOSCOPY: ICD-10-CM

## 2017-08-10 DIAGNOSIS — Z79.891 CHRONICALLY ON OPIATE THERAPY: ICD-10-CM

## 2017-08-10 DIAGNOSIS — I87.2 VENOUS INSUFFICIENCY: ICD-10-CM

## 2017-08-10 DIAGNOSIS — E66.09 NON MORBID OBESITY DUE TO EXCESS CALORIES: ICD-10-CM

## 2017-08-10 DIAGNOSIS — M12.812 ROTATOR CUFF ARTHROPATHY, LEFT: ICD-10-CM

## 2017-08-10 DIAGNOSIS — K26.9 DUODENAL ULCER: ICD-10-CM

## 2017-08-10 DIAGNOSIS — I10 ESSENTIAL HYPERTENSION: Primary | ICD-10-CM

## 2017-08-10 DIAGNOSIS — Z00.00 PREVENTATIVE HEALTH CARE: ICD-10-CM

## 2017-08-10 RX ORDER — TRAMADOL HYDROCHLORIDE 50 MG/1
50 TABLET ORAL
Qty: 60 TAB | Refills: 2 | Status: SHIPPED | OUTPATIENT
Start: 2017-08-10 | End: 2018-02-21 | Stop reason: SDUPTHER

## 2017-08-10 RX ORDER — METOPROLOL SUCCINATE 25 MG/1
25 TABLET, EXTENDED RELEASE ORAL DAILY
Qty: 90 TAB | Refills: 3 | Status: SHIPPED | OUTPATIENT
Start: 2017-08-10 | End: 2018-08-15 | Stop reason: SDUPTHER

## 2017-08-10 RX ORDER — LANSOPRAZOLE 30 MG/1
30 CAPSULE, DELAYED RELEASE ORAL
Qty: 90 CAP | Refills: 11 | Status: SHIPPED | OUTPATIENT
Start: 2017-08-10 | End: 2018-08-15 | Stop reason: SDUPTHER

## 2017-08-10 NOTE — MR AVS SNAPSHOT
Visit Information Date & Time Provider Department Dept. Phone Encounter #  
 8/10/2017  4:45 PM Ame Roldan MD [facility] - [facility] [phone] [id number] Follow-up Instructions Return in about 3 months (around 11/10/2017). Follow-up and Disposition History Upcoming Health Maintenance Date Due FOBT Q 1 YEAR AGE 50-75 4/12/2017 BREAST CANCER SCRN MAMMOGRAM 4/28/2017 INFLUENZA AGE 9 TO ADULT 8/1/2017 PAP AKA CERVICAL CYTOLOGY 4/16/2018 DTaP/Tdap/Td series (2 - Td) 1/24/2027 Allergies as of 8/10/2017  Review Complete On: 8/10/2017 By: Ame Roldan MD  
 No Known Allergies Current Immunizations  Never Reviewed No immunizations on file. Not reviewed this visit You Were Diagnosed With   
  
 Codes Comments Essential hypertension    -  Primary ICD-10-CM: I10 
ICD-9-CM: 401.9 Venous insufficiency     ICD-10-CM: I87.2 ICD-9-CM: 459.81 Rotator cuff arthropathy, left     ICD-10-CM: C03.904 ICD-9-CM: 716.81 Preventative health care     ICD-10-CM: Z00.00 ICD-9-CM: V70.0 Non morbid obesity due to excess calories     ICD-10-CM: E66.09 
ICD-9-CM: 278.00 Duodenal ulcer     ICD-10-CM: K26.9 ICD-9-CM: 532.90 S/P colonoscopy     ICD-10-CM: F72.434 ICD-9-CM: V45.89 Chronically on opiate therapy     ICD-10-CM: Z79.891 ICD-9-CM: V58.69 Vitals BP Pulse Temp Resp Height(growth percentile) Weight(growth percentile) 143/75 (BP 1 Location: Left arm, BP Patient Position: Sitting) 70 97.4 °F (36.3 °C) (Oral) 16 5' 7\" (1.702 m) 240 lb (108.9 kg) SpO2 BMI OB Status Smoking Status 99% 37.59 kg/m2 Postmenopausal Never Smoker BMI and BSA Data Body Mass Index Body Surface Area  
 37.59 kg/m 2 2.27 m 2 Preferred Pharmacy Pharmacy Name Phone [id number] [place], [address] [phone] Your Updated Medication List  
  
   
This list is accurate as of: 8/10/17  5:02 PM.  Always use your most recent med list.  
  
  
  
  
 cetirizine 10 mg tablet Commonly known as:  ZYRTEC Take 1 Tab by mouth daily. lansoprazole 30 mg capsule Commonly known as:  PREVACID Take 1 Cap by mouth Daily (before breakfast). metoprolol succinate 25 mg XL tablet Commonly known as:  TOPROL-XL Take 1 Tab by mouth daily. mometasone 50 mcg/actuation nasal spray Commonly known as:  NASONEX  
USE 2 SPRAYS BY BOTH NOSTRILS ROUTE DAILY. traMADol 50 mg tablet Commonly known as:  ULTRAM  
Take 1 Tab by mouth every six (6) hours as needed for Pain. Max Daily Amount: 200 mg. Prescriptions Printed Refills  
 traMADol (ULTRAM) 50 mg tablet 2 Sig: Take 1 Tab by mouth every six (6) hours as needed for Pain. Max Daily Amount: 200 mg. Class: Print Route: Oral  
  
Prescriptions Sent to Pharmacy Refills  
 metoprolol succinate (TOPROL-XL) 25 mg XL tablet 3 Sig: Take 1 Tab by mouth daily. Class: Normal  
 Pharmacy: [address], [address] Ph #: [phone] Route: Oral  
 lansoprazole (PREVACID) 30 mg capsule 11 Sig: Take 1 Cap by mouth Daily (before breakfast). Class: Normal  
 Pharmacy: [address], [address] Ph #: [phone] Route: Oral  
  
We Performed the Following CBC WITH AUTOMATED DIFF [86660 CPT(R)] HEMOGLOBIN A1C WITH EAG [92709 CPT(R)] LIPID PANEL [02376 CPT(R)] METABOLIC PANEL, COMPREHENSIVE [02859 CPT(R)] PAIN MGMT PANEL W/REFL, UR [[id number] Custom] IA COLLECTION VENOUS BLOOD,VENIPUNCTURE [id number] CPT(R)] TSH 3RD GENERATION [42045 CPT(R)] URINALYSIS W/ RFLX MICROSCOPIC [87302 CPT(R)] Follow-up Instructions Return in about 3 months (around 11/10/2017). Introducing [facility]! [facility] introduces Open Home Pro patient portal. Now you can access parts of your medical record, email your doctor's office, and request medication refills online. 1. In your internet browser, go to [email or web address] 2. Click on the First Time User? Click Here link in the Sign In box. You will see the New Member Sign Up page. 3. Enter your Open Home Pro Access Code exactly as it appears below. You will not need to use this code after youve completed the sign-up process. If you do not sign up before the expiration date, you must request a new code. · Open Home Pro Access Code: [email or web address] Expires: 11/8/2017  4:38 PM 
 
4. Enter the last four digits of your Social Security Number (xxxx) and Date of Birth (mm/dd/yyyy) as indicated and click Submit. You will be taken to the next sign-up page. 5. Create a Open Home Pro ID. This will be your Open Home Pro login ID and cannot be changed, so think of one that is secure and easy to remember. 6. Create a Open Home Pro password. You can change your password at any time. 7. Enter your Password Reset Question and Answer. This can be used at a later time if you forget your password. 8. Enter your e-mail address. You will receive e-mail notification when new information is available in [address] 9. Click Sign Up. You can now view and download portions of your medical record. 10. Click the Download Summary menu link to download a portable copy of your medical information. If you have questions, please visit the Frequently Asked Questions section of the Open Home Pro website. Remember, Open Home Pro is NOT to be used for urgent needs. For medical emergencies, dial 911. Now available from your iPhone and Android! Please provide this summary of care documentation to your next provider. Your primary care clinician is listed as Leigh Ann Garcia.  If you have any questions after today's visit, please call [phone].

## 2017-08-10 NOTE — PROGRESS NOTES
SPORTS MEDICINE [facility]ARE  Juan C Taylor MD, [place], [place] [address] [id number]  Phone:  [phone]  Fax: [phone]       Chief Complaint   Patient presents with    Follow-up     4 month visit    Shoulder Pain     left shoulder pain   . SUBJECTIVE:    Ramya Engel is a 64 y.o. female Patient returns today alert, appropriate, ambulatory, has the capacity to give an accurate history. She has a known history of primary hypertension, , obesity and comes in . Patient returns today continuing to have discomfort in her shoulder on the left. We injected it in the past with little relief. She had to stop the NSAID because of the duodenal ulcer seen by Dr. Terrie Toure in March. Patient denies other specific complaints and is seen for evaluation. Current Outpatient Prescriptions   Medication Sig Dispense Refill    metoprolol succinate (TOPROL-XL) 25 mg XL tablet Take 1 Tab by mouth daily. 90 Tab 3    lansoprazole (PREVACID) 30 mg capsule Take 1 Cap by mouth Daily (before breakfast). 90 Cap 11    traMADol (ULTRAM) 50 mg tablet Take 1 Tab by mouth every six (6) hours as needed for Pain. Max Daily Amount: 200 mg. 60 Tab 2    mometasone (NASONEX) 50 mcg/actuation nasal spray USE 2 SPRAYS BY BOTH NOSTRILS ROUTE DAILY. 1 Container 11    cetirizine (ZYRTEC) 10 mg tablet Take 1 Tab by mouth daily.  27 Tab 11     Past Medical History:   Diagnosis Date    Bereavement 14    78 yo mother  - aspiration    Chronically on opiate therapy     Duodenal ulcer 2017    md mojgan    H/O colonoscopy 11    transversve colon    Hypertension     Obesity     Preventative health care     Rectal bleeding     Rotator cuff arthropathy     S/P colonoscopy 2017    diverticulosis - md mojgan    Venous insufficiency      Past Surgical History:   Procedure Laterality Date    COLONOSCOPY N/A 3/28/2017    COLONOSCOPY  performed by Emely Greene MD at [place] No Known Allergies      REVIEW OF SYSTEMS:  General: negative for - chills or fever  ENT: negative for - headaches, nasal congestion or tinnitus  Respiratory: negative for - cough, hemoptysis, shortness of breath or wheezing  Cardiovascular : negative for - chest pain, edema, palpitations or shortness of breath  Gastrointestinal: negative for - abdominal pain, blood in stools, heartburn or nausea/vomiting  Genito-Urinary: no dysuria, trouble voiding, or hematuria  Musculoskeletal: negative for - gait disturbance, joint pain, joint stiffness or joint swelling  Neurological: no TIA or stroke symptoms  Hematologic: no bruises, no bleeding, no swollen glands  Integument: no lumps, mole changes, nail changes or rash  Endocrine: no malaise/lethargy or unexpected weight changes      Social History     Social History    Marital status: SINGLE     Spouse name: N/A    Number of children: N/A    Years of education: N/A     Social History Main Topics    Smoking status: Never Smoker    Smokeless tobacco: Never Used    Alcohol use Yes    Drug use: No    Sexual activity: Not Asked     Other Topics Concern    None     Social History Narrative    Medical History: 2011 CT abdomen and pelvis 4.5 simple cyst right adnexa otherw ise negative    Surgical History: colonoscopy to transverse colon and referred to gastroenterology 2011    Hospitalization/Major Diagnostic Procedure: Denies Past Hospitalization    Family History: Mother:  79 aspiration Father: deceasedSister(s): aliveBrother(s): aliveSon(s): alive1 brother(s) , 6 sister(s) . 1 son(s) . Social History: Alcohol Use Patient uses alcohol, Drinks per occasion: 1, Drinks per w [place]: 0. Smoking Status Patient is a never    smoker. Marital Status: Single. Lives w ith: alone. Occupation/W ork: employed full time -[employer] . Education/School: elementary    school 8th.  Worship: no.     Family History   Problem Relation Age of Onset    Diabetes Father     Hypertension Father     Stroke Father        OBJECTIVE:    Visit Vitals    /75 (BP 1 Location: Left arm, BP Patient Position: Sitting)    Pulse 70    Temp 97.4 °F (36.3 °C) (Oral)    Resp 16    Ht 5' 7\" (1.702 m)    Wt 240 lb (108.9 kg)    SpO2 99%    BMI 37.59 kg/m2     CONSTITUTIONAL: well , well nourished, appears age appropriate  EYES: perrla, eom intact  ENMT:moist mucous membranes, pharynx clear  NECK: supple. Thyroid normal  RESPIRATORY: Chest: clear bilaterally   CARDIOVASCULAR: Heart: regular rate and rhythm  GASTROINTESTINAL: Abdomen: soft, bowel sounds active  HEMATOLOGIC: no pathological lymph nodes palpated  MUSCULOSKELETAL: Extremities: no edema, pulse 1+   INTEGUMENT: No unusual rashes or suspicious skin lesions noted. Nails appear normal.  NEUROLOGIC: non-focal exam   MENTAL STATUS: alert and oriented, appropriate affect           ASSESSMENT:  1. Essential hypertension    2. Venous insufficiency    3. Rotator cuff arthropathy, left    4. Preventative health care    5. Non morbid obesity due to excess calories    6. Duodenal ulcer    7. S/P colonoscopy    8. Chronically on opiate therapy      For the shoulder discomfort we offered physical therapy, which she declines, orthopedic referral, which she declines. We cannot use NSAIDs and Tylenol is not adequate relief of the discomfort and therefore she is relegated to Tramadol. She agrees to sign the contract and its contents. She gives us a urine sample for drug screen. She'll return to see us in four to six months, sooner if needed. The pain pills she will come get every month, but has to give us a urine sample every three months. We will renew the Metoprolol, as well as the Prevacid for her  stomach  GERD. She has symptomatic GERD. She will return to the office in three to four months, sooner if needed.   We congratulated her on her weight loss and encouraged her to continue weight loss and continue physical activity 30 minutes five days a week. PLAN:  .  Orders Placed This Encounter    URINALYSIS W/ RFLX MICROSCOPIC    CBC WITH AUTOMATED DIFF    METABOLIC PANEL, COMPREHENSIVE    LIPID PANEL    TSH 3RD GENERATION    HEMOGLOBIN A1C WITH EAG    PAIN MGMT PANEL W/REFL, UR    metoprolol succinate (TOPROL-XL) 25 mg XL tablet    lansoprazole (PREVACID) 30 mg capsule    traMADol (ULTRAM) 50 mg tablet       Follow-up Disposition:  Return in about 3 months (around 11/10/2017). ATTENTION:   This medical record was transcribed using an electronic medical records system. Although proofread, it may and can contain electronic and spelling errors. Other human spelling and other errors may be present. Corrections may be executed at a later time. Please feel free to contact us for any clarifications as needed.

## 2017-08-10 NOTE — PROGRESS NOTES
1. Have you been to the ER, urgent care clinic since your last visit? Hospitalized since your last visit? No    2. Have you seen or consulted any other health care providers outside of the [address] [place] [address] since your last visit? Include any pap smears or colon screening.  No

## 2017-08-11 LAB
ALBUMIN SERPL-MCNC: 4.3 G/DL (ref 3.5–5.5)
ALBUMIN/GLOB SERPL: 1.1 {RATIO} (ref 1.2–2.2)
ALP SERPL-CCNC: 82 IU/L (ref 39–117)
ALT SERPL-CCNC: 16 IU/L (ref 0–32)
AMPHETAMINES UR QL SCN: NEGATIVE NG/ML
APPEARANCE UR: CLEAR
AST SERPL-CCNC: 18 IU/L (ref 0–40)
BARBITURATES UR QL SCN: NEGATIVE NG/ML
BASOPHILS # BLD AUTO: 0 X10E3/UL (ref 0–0.2)
BASOPHILS NFR BLD AUTO: 1 %
BENZODIAZ UR QL SCN: NEGATIVE NG/ML
BILIRUB SERPL-MCNC: 0.4 MG/DL (ref 0–1.2)
BILIRUB UR QL STRIP: NEGATIVE
BUN SERPL-MCNC: 13 MG/DL (ref 6–24)
BUN/CREAT SERPL: 13 (ref 9–23)
BZE UR QL SCN: NEGATIVE NG/ML
CALCIUM SERPL-MCNC: 9.6 MG/DL (ref 8.7–10.2)
CANNABINOIDS UR QL SCN: NEGATIVE NG/ML
CHLORIDE SERPL-SCNC: 100 MMOL/L (ref 96–106)
CHOLEST SERPL-MCNC: 211 MG/DL (ref 100–199)
CO2 SERPL-SCNC: 25 MMOL/L (ref 18–29)
COLOR UR: YELLOW
CREAT SERPL-MCNC: 1.04 MG/DL (ref 0.57–1)
CREAT UR-MCNC: 96.6 MG/DL (ref 20–300)
EOSINOPHIL # BLD AUTO: 0.3 X10E3/UL (ref 0–0.4)
EOSINOPHIL NFR BLD AUTO: 4 %
ERYTHROCYTE [DISTWIDTH] IN BLOOD BY AUTOMATED COUNT: 14.3 % (ref 12.3–15.4)
EST. AVERAGE GLUCOSE BLD GHB EST-MCNC: 117 MG/DL
FENTANYL+NORFENTANYL UR QL SCN: NEGATIVE PG/ML
GLOBULIN SER CALC-MCNC: 3.9 G/DL (ref 1.5–4.5)
GLUCOSE SERPL-MCNC: 85 MG/DL (ref 65–99)
GLUCOSE UR QL: NEGATIVE
HBA1C MFR BLD: 5.7 % (ref 4.8–5.6)
HCT VFR BLD AUTO: 39 % (ref 34–46.6)
HDLC SERPL-MCNC: 53 MG/DL
HGB BLD-MCNC: 12.7 G/DL (ref 11.1–15.9)
HGB UR QL STRIP: NEGATIVE
IMM GRANULOCYTES # BLD: 0 X10E3/UL (ref 0–0.1)
IMM GRANULOCYTES NFR BLD: 0 %
KETONES UR QL STRIP: NEGATIVE
LDLC SERPL CALC-MCNC: 129 MG/DL (ref 0–99)
LEUKOCYTE ESTERASE UR QL STRIP: NEGATIVE
LYMPHOCYTES # BLD AUTO: 2.8 X10E3/UL (ref 0.7–3.1)
LYMPHOCYTES NFR BLD AUTO: 45 %
MCH RBC QN AUTO: 28.2 PG (ref 26.6–33)
MCHC RBC AUTO-ENTMCNC: 32.6 G/DL (ref 31.5–35.7)
MCV RBC AUTO: 87 FL (ref 79–97)
MEPERIDINE UR QL: NEGATIVE NG/ML
METHADONE UR QL SCN: NEGATIVE NG/ML
MICRO URNS: NORMAL
MONOCYTES # BLD AUTO: 0.7 X10E3/UL (ref 0.1–0.9)
MONOCYTES NFR BLD AUTO: 11 %
NEUTROPHILS # BLD AUTO: 2.4 X10E3/UL (ref 1.4–7)
NEUTROPHILS NFR BLD AUTO: 39 %
NITRITE UR QL STRIP: NEGATIVE
OPIATES UR QL SCN: NEGATIVE NG/ML
OXYCODONE+OXYMORPHONE UR QL SCN: NEGATIVE NG/ML
PCP UR QL: NEGATIVE NG/ML
PH UR STRIP: 6 [PH] (ref 5–7.5)
PH UR: 5.7 [PH] (ref 4.5–8.9)
PLATELET # BLD AUTO: 259 X10E3/UL (ref 150–379)
PLEASE NOTE:, 733157: NORMAL
POTASSIUM SERPL-SCNC: 4.1 MMOL/L (ref 3.5–5.2)
PROPOXYPH UR QL SCN: NEGATIVE NG/ML
PROT SERPL-MCNC: 8.2 G/DL (ref 6–8.5)
PROT UR QL STRIP: NEGATIVE
RBC # BLD AUTO: 4.51 X10E6/UL (ref 3.77–5.28)
SODIUM SERPL-SCNC: 141 MMOL/L (ref 134–144)
SP GR UR: 1.01
SP GR UR: 1.01 (ref 1–1.03)
TRAMADOL UR QL SCN: NEGATIVE NG/ML
TRIGL SERPL-MCNC: 147 MG/DL (ref 0–149)
TSH SERPL DL<=0.005 MIU/L-ACNC: 2.23 UIU/ML (ref 0.45–4.5)
UROBILINOGEN UR STRIP-MCNC: 0.2 MG/DL (ref 0.2–1)
VLDLC SERPL CALC-MCNC: 29 MG/DL (ref 5–40)
WBC # BLD AUTO: 6.2 X10E3/UL (ref 3.4–10.8)

## 2018-02-21 ENCOUNTER — OFFICE VISIT (OUTPATIENT)
Dept: INTERNAL MEDICINE CLINIC | Age: 58
End: 2018-02-21

## 2018-02-21 VITALS
WEIGHT: 235.1 LBS | TEMPERATURE: 97.8 F | RESPIRATION RATE: 18 BRPM | SYSTOLIC BLOOD PRESSURE: 134 MMHG | HEART RATE: 72 BPM | HEIGHT: 67 IN | BODY MASS INDEX: 36.9 KG/M2 | OXYGEN SATURATION: 97 % | DIASTOLIC BLOOD PRESSURE: 83 MMHG

## 2018-02-21 DIAGNOSIS — K26.9 DUODENAL ULCER: ICD-10-CM

## 2018-02-21 DIAGNOSIS — I10 ESSENTIAL HYPERTENSION: ICD-10-CM

## 2018-02-21 DIAGNOSIS — I87.2 VENOUS INSUFFICIENCY: ICD-10-CM

## 2018-02-21 DIAGNOSIS — Z79.891 CHRONICALLY ON OPIATE THERAPY: ICD-10-CM

## 2018-02-21 DIAGNOSIS — M75.102 LEFT ROTATOR CUFF TEAR ARTHROPATHY: ICD-10-CM

## 2018-02-21 DIAGNOSIS — G89.29 CHRONIC MIDLINE LOW BACK PAIN WITHOUT SCIATICA: Primary | ICD-10-CM

## 2018-02-21 DIAGNOSIS — M12.812 LEFT ROTATOR CUFF TEAR ARTHROPATHY: ICD-10-CM

## 2018-02-21 DIAGNOSIS — M54.50 CHRONIC MIDLINE LOW BACK PAIN WITHOUT SCIATICA: Primary | ICD-10-CM

## 2018-02-21 RX ORDER — TRAMADOL HYDROCHLORIDE 50 MG/1
50 TABLET ORAL
Qty: 60 TAB | Refills: 2 | Status: SHIPPED | OUTPATIENT
Start: 2018-02-21 | End: 2018-04-24

## 2018-02-21 RX ORDER — METHOCARBAMOL 750 MG/1
750 TABLET, FILM COATED ORAL 4 TIMES DAILY
Qty: 100 TAB | Refills: 2 | Status: SHIPPED | OUTPATIENT
Start: 2018-02-21 | End: 2018-04-24 | Stop reason: SINTOL

## 2018-02-21 NOTE — PROGRESS NOTES
1. Have you been to the ER, urgent care clinic since your last visit? Hospitalized since your last visit? No    2. Have you seen or consulted any other health care providers outside of the [address] [place] [address] since your last visit? Include any pap smears or colon screening.  No     Back pain

## 2018-02-21 NOTE — PATIENT INSTRUCTIONS
BLAIR CANCHOLA     Body Mass Index: Care Instructions  Your Care Instructions    Body mass index (BMI) can help you see if your weight is raising your risk for health problems. It uses a formula to compare how much you weigh with how tall you are. · A BMI lower than 18.5 is considered underweight. · A BMI between 18.5 and 24.9 is considered healthy. · A BMI between 25 and 29.9 is considered overweight. A BMI of 30 or higher is considered obese. If your BMI is in the normal range, it means that you have a lower risk for weight-related health problems. If your BMI is in the overweight or obese range, you may be at increased risk for weight-related health problems, such as high blood pressure, heart disease, stroke, arthritis or joint pain, and diabetes. If your BMI is in the underweight range, you may be at increased risk for health problems such as fatigue, lower protection (immunity) against illness, muscle loss, bone loss, hair loss, and hormone problems. BMI is just one measure of your risk for weight-related health problems. You may be at higher risk for health problems if you are not active, you eat an unhealthy diet, or you drink too much alcohol or use tobacco products. Follow-up care is a key part of your treatment and safety. Be sure to make and go to all appointments, and call your doctor if you are having problems. It's also a good idea to know your test results and keep a list of the medicines you take. How can you care for yourself at home? · Practice healthy eating habits. This includes eating plenty of fruits, vegetables, whole grains, lean protein, and low-fat dairy. · If your doctor recommends it, get more exercise. Walking is a good choice. Bit by bit, increase the amount you walk every day. Try for at least 30 minutes on most days of the week. · Do not smoke. Smoking can increase your risk for health problems.  If you need help quitting, talk to your doctor about stop-smoking programs and medicines. These can increase your chances of quitting for good. · Limit alcohol to 2 drinks a day for men and 1 drink a day for women. Too much alcohol can cause health problems. If you have a BMI higher than 25  · Your doctor may do other tests to check your risk for weight-related health problems. This may include measuring the distance around your waist. A waist measurement of more than 40 inches in men or 35 inches in women can increase the risk of weight-related health problems. · Talk with your doctor about steps you can take to stay healthy or improve your health. You may need to make lifestyle changes to lose weight and stay healthy, such as changing your diet and getting regular exercise. If you have a BMI lower than 18.5  · Your doctor may do other tests to check your risk for health problems. · Talk with your doctor about steps you can take to stay healthy or improve your health. You may need to make lifestyle changes to gain or maintain weight and stay healthy, such as getting more healthy foods in your diet and doing exercises to build muscle. Where can you learn more? Go to http://mallory-john.info/. Enter S176 in the search box to learn more about \"Body Mass Index: Care Instructions. \"  Current as of: October 13, 2016  Content Version: 11.4  © 6082-6104 Healthwise, Incorporated. Care instructions adapted under license by [employer] (which disclaims liability or warranty for this information). If you have questions about a medical condition or this instruction, always ask your healthcare professional. [place] [address] any warranty or liability for your use of this information.
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
## 2018-02-21 NOTE — MR AVS SNAPSHOT
[address] [place] [address] [place] [id number] 
[phone] Patient: Darryn Ortiz MRN: [id number] KEX:[id number] Visit Information Date & Time Provider Department Dept. Phone Encounter #  
 2/21/2018 12:00 PM Lillian Galarza MD [facility] Sports Medicine and [place] [address] [id number] Follow-up Instructions Return in about 2 months (around 4/21/2018). Follow-up and Disposition History Upcoming Health Maintenance Date Due  
 BREAST CANCER SCRN MAMMOGRAM 4/28/2017 PAP AKA CERVICAL CYTOLOGY 4/16/2018 DTaP/Tdap/Td series (2 - Td) 1/24/2027 COLONOSCOPY 3/28/2027 Allergies as of 2/21/2018  Review Complete On: 2/21/2018 By: Lillian Galarza MD  
 No Known Allergies Current Immunizations  Never Reviewed No immunizations on file. Not reviewed this visit You Were Diagnosed With   
  
 Codes Comments Chronic midline low back pain without sciatica    -  Primary ICD-10-CM: M54.5, G89.29 ICD-9-CM: 724.2, 338.29 Duodenal ulcer     ICD-10-CM: K26.9 ICD-9-CM: 532.90 Chronically on opiate therapy     ICD-10-CM: Z79.891 ICD-9-CM: V58.69 Essential hypertension     ICD-10-CM: I10 
ICD-9-CM: 401.9 Venous insufficiency     ICD-10-CM: I87.2 ICD-9-CM: 459.81 Left rotator cuff tear arthropathy     ICD-10-CM: M12.812 ICD-9-CM: 716.81 Vitals BP Pulse Temp Resp Height(growth percentile) Weight(growth percentile) 134/83 72 97.8 °F (36.6 °C) (Oral) 18 5' 7\" (1.702 m) 235 lb 1.6 oz (106.6 kg) SpO2 BMI OB Status Smoking Status 97% 36.82 kg/m2 Postmenopausal Never Smoker Vitals History BMI and BSA Data Body Mass Index Body Surface Area  
 36.82 kg/m 2 2.24 m 2 Preferred Pharmacy Pharmacy Name Phone [id number] [place], [address] [phone] Your Updated Medication List  
  
   
This list is accurate as of 2/21/18  2:09 PM.  Always use your most recent med list.  
  
  
  
  
 cetirizine 10 mg tablet Commonly known as:  ZYRTEC Take 1 Tab by mouth daily. lansoprazole 30 mg capsule Commonly known as:  PREVACID Take 1 Cap by mouth Daily (before breakfast). methocarbamol 750 mg tablet Commonly known as:  ROBAXIN Take 1 Tab by mouth four (4) times daily. metoprolol succinate 25 mg XL tablet Commonly known as:  TOPROL-XL Take 1 Tab by mouth daily. mometasone 50 mcg/actuation nasal spray Commonly known as:  NASONEX  
USE 2 SPRAYS BY BOTH NOSTRILS ROUTE DAILY. traMADol 50 mg tablet Commonly known as:  ULTRAM  
Take 1 Tab by mouth every six (6) hours as needed for Pain. Max Daily Amount: 200 mg. Prescriptions Printed Refills  
 traMADol (ULTRAM) 50 mg tablet 2 Sig: Take 1 Tab by mouth every six (6) hours as needed for Pain. Max Daily Amount: 200 mg. Class: Print Route: Oral  
  
Prescriptions Sent to Pharmacy Refills  
 methocarbamol (ROBAXIN) 750 mg tablet 2 Sig: Take 1 Tab by mouth four (4) times daily. Class: Normal  
 Pharmacy: [facility], [address] Ph #: [phone] Route: Oral  
  
We Performed the Following PAIN MGMT PANEL W/REFL, UR [IBT49625 Custom] REFERRAL TO PHYSICAL THERAPY [EXQ97 Custom] Follow-up Instructions Return in about 2 months (around 4/21/2018). To-Do List   
 02/21/2018 Imaging:  XR SPINE LUMB 2 OR 3 V Referral Information Referral ID Referred By Referred To  
  
 [id number] aKri DAS Not Available Visits Status Start Date End Date 1 New Request 2/21/18 2/21/19 If your referral has a status of pending review or denied, additional information will be sent to support the outcome of this decision. Patient Instructions BLAIR [place]A Body Mass Index: Care Instructions Your Care Instructions Body mass index (BMI) can help you see if your weight is raising your risk for health problems. It uses a formula to compare how much you weigh with how tall you are. · A BMI lower than 18.5 is considered underweight. · A BMI between 18.5 and 24.9 is considered healthy. · A BMI between 25 and 29.9 is considered overweight. A BMI of 30 or higher is considered obese. If your BMI is in the normal range, it means that you have a lower risk for weight-related health problems. If your BMI is in the overweight or obese range, you may be at increased risk for weight-related health problems, such as high blood pressure, heart disease, stroke, arthritis or joint pain, and diabetes. If your BMI is in the underweight range, you may be at increased risk for health problems such as fatigue, lower protection (immunity) against illness, muscle loss, bone loss, hair loss, and hormone problems. BMI is just one measure of your risk for weight-related health problems. You may be at higher risk for health problems if you are not active, you eat an unhealthy diet, or you drink too much alcohol or use tobacco products. Follow-up care is a key part of your treatment and safety. Be sure to make and go to all appointments, and call your doctor if you are having problems. It's also a good idea to know your test results and keep a list of the medicines you take. How can you care for yourself at home? · Practice healthy eating habits. This includes eating plenty of fruits, vegetables, whole grains, lean protein, and low-fat dairy. · If your doctor recommends it, get more exercise. Walking is a good choice. Bit by bit, increase the amount you walk every day. Try for at least 30 minutes on most days of the week. · Do not smoke. Smoking can increase your risk for health problems.  If you need help quitting, talk to your doctor about stop-smoking programs and medicines. These can increase your chances of quitting for good. · Limit alcohol to 2 drinks a day for men and 1 drink a day for women. Too much alcohol can cause health problems. If you have a BMI higher than 25 · Your doctor may do other tests to check your risk for weight-related health problems. This may include measuring the distance around your waist. A waist measurement of more than 40 inches in men or 35 inches in women can increase the risk of weight-related health problems. · Talk with your doctor about steps you can take to stay healthy or improve your health. You may need to make lifestyle changes to lose weight and stay healthy, such as changing your diet and getting regular exercise. If you have a BMI lower than 18.5 · Your doctor may do other tests to check your risk for health problems. · Talk with your doctor about steps you can take to stay healthy or improve your health. You may need to make lifestyle changes to gain or maintain weight and stay healthy, such as getting more healthy foods in your diet and doing exercises to build muscle. Where can you learn more? Go to http://mallory-john.info/. Enter S176 in the search box to learn more about \"Body Mass Index: Care Instructions. \" Current as of: October 13, 2016 Content Version: 11.4 © 3975-1884 Healthwise, Incorporated. Care instructions adapted under license by [employer] (which disclaims liability or warranty for this information). If you have questions about a medical condition or this instruction, always ask your healthcare professional. [place] [address] any warranty or liability for your use of this information. Patient Instructions History Introducing [facility] & HEALTH SERVICES!    
 [employer] [facility] introduces [employer] patient portal. Now you can access parts of your medical record, email your doctor's office, and request medication refills online. 1. In your internet browser, go to [email or web address] 2. Click on the First Time User? Click Here link in the Sign In box. You will see the New Member Sign Up page. 3. Enter your Rare Pink Access Code exactly as it appears below. You will not need to use this code after youve completed the sign-up process. If you do not sign up before the expiration date, you must request a new code. · Rare Pink Access Code: [email or web address] Expires: 5/22/2018  1:25 PM 
 
4. Enter the last four digits of your Social Security Number (xxxx) and Date of Birth (mm/dd/yyyy) as indicated and click Submit. You will be taken to the next sign-up page. 5. Create a Rare Pink ID. This will be your Rare Pink login ID and cannot be changed, so think of one that is secure and easy to remember. 6. Create a Rare Pink password. You can change your password at any time. 7. Enter your Password Reset Question and Answer. This can be used at a later time if you forget your password. 8. Enter your e-mail address. You will receive e-mail notification when new information is available in [address] 9. Click Sign Up. You can now view and download portions of your medical record. 10. Click the Download Summary menu link to download a portable copy of your medical information. If you have questions, please visit the Frequently Asked Questions section of the Rare Pink website. Remember, Rare Pink is NOT to be used for urgent needs. For medical emergencies, dial 911. Now available from your iPhone and Android! Please provide this summary of care documentation to your next provider. Your primary care clinician is listed as Stanley Pinto. If you have any questions after today's visit, please call [phone].

## 2018-02-27 LAB
AMPHETAMINES UR QL SCN: NEGATIVE NG/ML
BARBITURATES UR QL SCN: NEGATIVE NG/ML
BENZODIAZ UR QL SCN: NEGATIVE NG/ML
BZE UR QL SCN: NEGATIVE NG/ML
CANNABINOIDS UR QL SCN: NEGATIVE NG/ML
CREAT UR-MCNC: 91.6 MG/DL (ref 20–300)
FENTANYL+NORFENTANYL UR QL SCN: NEGATIVE PG/ML
MEPERIDINE UR QL: NEGATIVE NG/ML
METHADONE UR QL SCN: NEGATIVE NG/ML
OPIATES UR QL SCN: NEGATIVE NG/ML
OXYCODONE+OXYMORPHONE UR QL SCN: NEGATIVE NG/ML
PCP UR QL: NEGATIVE NG/ML
PH UR: 5.5 [PH] (ref 4.5–8.9)
PLEASE NOTE:, 733157: ABNORMAL
PROPOXYPH UR QL SCN: NEGATIVE NG/ML
SP GR UR: 1.02
TRAMADOL UR-MCNC: POSITIVE UG/ML

## 2018-04-24 ENCOUNTER — OFFICE VISIT (OUTPATIENT)
Dept: INTERNAL MEDICINE CLINIC | Age: 58
End: 2018-04-24

## 2018-04-24 VITALS
HEART RATE: 68 BPM | TEMPERATURE: 97.2 F | DIASTOLIC BLOOD PRESSURE: 72 MMHG | RESPIRATION RATE: 16 BRPM | HEIGHT: 67 IN | OXYGEN SATURATION: 96 % | SYSTOLIC BLOOD PRESSURE: 143 MMHG | BODY MASS INDEX: 37.01 KG/M2 | WEIGHT: 235.8 LBS

## 2018-04-24 DIAGNOSIS — M12.812 ROTATOR CUFF ARTHROPATHY OF LEFT SHOULDER: ICD-10-CM

## 2018-04-24 DIAGNOSIS — I10 ESSENTIAL HYPERTENSION: Primary | ICD-10-CM

## 2018-04-24 DIAGNOSIS — J34.89 RHINORRHEA: ICD-10-CM

## 2018-04-24 DIAGNOSIS — M54.50 CHRONIC MIDLINE LOW BACK PAIN WITHOUT SCIATICA: ICD-10-CM

## 2018-04-24 DIAGNOSIS — G89.29 CHRONIC MIDLINE LOW BACK PAIN WITHOUT SCIATICA: ICD-10-CM

## 2018-04-24 RX ORDER — MOMETASONE FUROATE 50 UG/1
2 SPRAY, METERED NASAL DAILY
Qty: 1 CONTAINER | Refills: 11 | Status: SHIPPED | OUTPATIENT
Start: 2018-04-24 | End: 2018-08-15

## 2018-04-24 NOTE — PROGRESS NOTES
SPORTS MEDICINE AND PRIMARY CARE  Paola Rolle MD, [address] [address]  Phone:  [phone]  Fax: [phone]       Chief Complaint   Patient presents with    Hypertension   . SUBJECTIVE:    Rell Herman is a 62 y.o. female Patient returns today with known history of rhinitis, hypertension, duodenal ulcer, chronic pain, on Tramadol, and is seen for evaluation. She has been taking the pain medicine for low back pain, but it doesn't seem to be effective. Therefore she stopped it and started taking Tylenol, which seems to be just as effective, and is seen for evaluation. Current Outpatient Prescriptions   Medication Sig Dispense Refill    mometasone (NASONEX) 50 mcg/actuation nasal spray 2 Sprays by Both Nostrils route daily. 1 Container 11    metoprolol succinate (TOPROL-XL) 25 mg XL tablet Take 1 Tab by mouth daily. 90 Tab 3    lansoprazole (PREVACID) 30 mg capsule Take 1 Cap by mouth Daily (before breakfast). 90 Cap 11    cetirizine (ZYRTEC) 10 mg tablet Take 1 Tab by mouth daily.  27 Tab 11     Past Medical History:   Diagnosis Date    Bereavement 14    80 yo mother  - aspiration    Chronically on opiate therapy     Duodenal ulcer 2017    md mojgan    H/O colonoscopy 11    transversve colon    Hypertension     Low back pain     Obesity     Preventative health care     Rectal bleeding     Rhinorrhea     Rotator cuff arthropathy     S/P colonoscopy 2017    diverticulosis - md mojgan    Venous insufficiency      Past Surgical History:   Procedure Laterality Date    COLONOSCOPY N/A 3/28/2017    COLONOSCOPY  performed by Sukh Bean MD at [place]     No Known Allergies      REVIEW OF SYSTEMS:  General: negative for - chills or fever  ENT: negative for - headaches, nasal congestion or tinnitus  Respiratory: negative for - cough, hemoptysis, shortness of breath or wheezing  Cardiovascular : negative for - chest pain, edema, palpitations or shortness of breath  Gastrointestinal: negative for - abdominal pain, blood in stools, heartburn or nausea/vomiting  Genito-Urinary: no dysuria, trouble voiding, or hematuria  Musculoskeletal: negative for - gait disturbance, joint pain, joint stiffness or joint swelling  Neurological: no TIA or stroke symptoms  Hematologic: no bruises, no bleeding, no swollen glands  Integument: no lumps, mole changes, nail changes or rash  Endocrine: no malaise/lethargy or unexpected weight changes      Social History     Social History    Marital status: SINGLE     Spouse name: N/A    Number of children: N/A    Years of education: N/A     Social History Main Topics    Smoking status: Never Smoker    Smokeless tobacco: Never Used    Alcohol use Yes      Comment: everyday    Drug use: No    Sexual activity: Not Currently     Other Topics Concern    None     Social History Narrative    Medical History: 2011 CT abdomen and pelvis 4.5 simple cyst right adnexa otherw ise negative    Surgical History: colonoscopy to transverse colon and referred to gastroenterology 2011    Hospitalization/Major Diagnostic Procedure: Denies Past Hospitalization    Family History: Mother:  79 aspiration Father: deceasedSister(s): aliveBrother(s): aliveSon(s): alive1 brother(s) , 6 sister(s) . 1 son(s) . Social History: Alcohol Use Patient uses alcohol, Drinks per occasion: 1, Drinks per w Native: 0. Smoking Status Patient is a never    smoker. Marital Status: Single. Lives w ith: alone. Occupation/W ork: employed full time -[employer] . Education/School: elementary    school 8th.  Buddhist: no.     Family History   Problem Relation Age of Onset    Diabetes Father     Hypertension Father     Stroke Father        OBJECTIVE:    Visit Vitals    /72    Pulse 68    Temp 97.2 °F (36.2 °C) (Oral)    Resp 16    Ht 5' 7\" (1.702 m)    Wt 235 lb 12.8 oz (107 kg)    SpO2 96%    BMI 36.93 kg/m2     CONSTITUTIONAL: well , well nourished, appears age appropriate  EYES: perrla, eom intact  ENMT:moist mucous membranes, pharynx clear  NECK: supple. Thyroid normal  RESPIRATORY: Chest: clear bilaterally   CARDIOVASCULAR: Heart: regular rate and rhythm  GASTROINTESTINAL: Abdomen: soft, bowel sounds active  HEMATOLOGIC: no pathological lymph nodes palpated  MUSCULOSKELETAL: Extremities: no edema, pulse 1+   INTEGUMENT: No unusual rashes or suspicious skin lesions noted. Nails appear normal.  NEUROLOGIC: non-focal exam   MENTAL STATUS: alert and oriented, appropriate affect           ASSESSMENT:  1. Essential hypertension    2. Chronic midline low back pain without sciatica    3. Rhinorrhea    4. Rotator cuff arthropathy of left shoulder      Tramadol not effective. She is using Tylenol, which we don't disagree, and it's just as effective if not better she states. She has year round rhinorrhea, is using Flonase. Will let her try Nasonex and see if that will be a little more effective. She is taking Zyrtec. We suggest she use Sudafed sparingly, advising her that it can run her blood pressure elevated. She works as a  and it is embarrassing to have rhinorrhea. Her weight remains the same. We certainly encourage physical activity and a heart healthy, weight reducing diet. She'll return to the office in about three or four months, sooner if she has any problems. She'll check her blood pressure to confirm it's staying in the normotensive range. PLAN:  .  Orders Placed This Encounter    WILL MAMMO BI SCREENING INCL CAD    mometasone (NASONEX) 50 mcg/actuation nasal spray       Follow-up Disposition:  Return in about 4 months (around 8/24/2018). ATTENTION:   This medical record was transcribed using an electronic medical records system. Although proofread, it may and can contain electronic and spelling errors.   Other human spelling and other errors may be present. Corrections may be executed at a later time. Please feel free to contact us for any clarifications as needed.

## 2018-04-24 NOTE — PROGRESS NOTES
1. Have you been to the ER, urgent care clinic since your last visit? Hospitalized since your last visit? No    2. Have you seen or consulted any other health care providers outside of the [address] [place] [address] since your last visit? Include any pap smears or colon screening.  No

## 2018-04-24 NOTE — MR AVS SNAPSHOT
[place] [address] 
[phone] Patient: Eliseo Carlson MRN: [id number] VS Visit Information Date & Time Provider Department Dept. Phone Encounter #  
 2018  3:00 PM Gaby Yates MD SPORTS MED AND PRIMARY CARE [facility] [phone] [id number] Follow-up Instructions Return in about 4 months (around 2018). Upcoming Health Maintenance Date Due  
 BREAST CANCER SCRN MAMMOGRAM 2020 PAP AKA CERVICAL CYTOLOGY 2021 DTaP/Tdap/Td series (2 - Td) 2027 COLONOSCOPY 3/28/2027 Allergies as of 2018  Review Complete On: 2018 By: Gaby Yates MD  
 No Known Allergies Current Immunizations  Never Reviewed No immunizations on file. Not reviewed this visit Vitals BP Pulse Temp Resp Height(growth percentile) Weight(growth percentile) 143/72 68 97.2 °F (36.2 °C) (Oral) 16 5' 7\" (1.702 m) 235 lb 12.8 oz (107 kg) SpO2 BMI OB Status Smoking Status 96% 36.93 kg/m2 Postmenopausal Never Smoker BMI and BSA Data Body Mass Index Body Surface Area  
 36.93 kg/m 2 2.25 m 2 Preferred Pharmacy Pharmacy Name Phone [id number] [place], [address] [phone] Your Updated Medication List  
  
   
This list is accurate as of 18  4:20 PM.  Always use your most recent med list.  
  
  
  
  
 cetirizine 10 mg tablet Commonly known as:  ZYRTEC Take 1 Tab by mouth daily. lansoprazole 30 mg capsule Commonly known as:  PREVACID Take 1 Cap by mouth Daily (before breakfast). metoprolol succinate 25 mg XL tablet Commonly known as:  TOPROL-XL Take 1 Tab by mouth daily. mometasone 50 mcg/actuation nasal spray Commonly known as:  NASONEX  
USE 2 SPRAYS BY BOTH NOSTRILS ROUTE DAILY. Follow-up Instructions Return in about 4 months (around 8/24/2018). Introducing [facility] & HEALTH SERVICES! [employer] introduces meets patient portal. Now you can access parts of your medical record, email your doctor's office, and request medication refills online. 1. In your internet browser, go to [email or web address] 2. Click on the First Time User? Click Here link in the Sign In box. You will see the New Member Sign Up page. 3. Enter your meets Access Code exactly as it appears below. You will not need to use this code after youve completed the sign-up process. If you do not sign up before the expiration date, you must request a new code. · meets Access Code: [email or web address] Expires: 5/22/2018  2:25 PM 
 
4. Enter the last four digits of your Social Security Number (xxxx) and Date of Birth (mm/dd/yyyy) as indicated and click Submit. You will be taken to the next sign-up page. 5. Create a meets ID. This will be your meets login ID and cannot be changed, so think of one that is secure and easy to remember. 6. Create a meets password. You can change your password at any time. 7. Enter your Password Reset Question and Answer. This can be used at a later time if you forget your password. 8. Enter your e-mail address. You will receive e-mail notification when new information is available in [address] 9. Click Sign Up. You can now view and download portions of your medical record. 10. Click the Download Summary menu link to download a portable copy of your medical information. If you have questions, please visit the Frequently Asked Questions section of the meets website. Remember, meets is NOT to be used for urgent needs. For medical emergencies, dial 911. Now available from your iPhone and Android! Please provide this summary of care documentation to your next provider. Your primary care clinician is listed as Gamaliel Chavarria. If you have any questions after today's visit, please call [phone].

## 2018-08-15 ENCOUNTER — OFFICE VISIT (OUTPATIENT)
Dept: INTERNAL MEDICINE CLINIC | Age: 58
End: 2018-08-15

## 2018-08-15 VITALS
SYSTOLIC BLOOD PRESSURE: 130 MMHG | WEIGHT: 236.6 LBS | OXYGEN SATURATION: 97 % | DIASTOLIC BLOOD PRESSURE: 80 MMHG | BODY MASS INDEX: 37.13 KG/M2 | TEMPERATURE: 97.5 F | HEIGHT: 67 IN | RESPIRATION RATE: 18 BRPM | HEART RATE: 77 BPM

## 2018-08-15 DIAGNOSIS — M12.812 ROTATOR CUFF ARTHROPATHY OF LEFT SHOULDER: ICD-10-CM

## 2018-08-15 DIAGNOSIS — I10 ESSENTIAL HYPERTENSION: Primary | ICD-10-CM

## 2018-08-15 DIAGNOSIS — E66.01 SEVERE OBESITY (BMI 35.0-39.9): ICD-10-CM

## 2018-08-15 DIAGNOSIS — I87.2 VENOUS INSUFFICIENCY: ICD-10-CM

## 2018-08-15 RX ORDER — LANSOPRAZOLE 30 MG/1
30 CAPSULE, DELAYED RELEASE ORAL
Qty: 90 CAP | Refills: 11 | Status: SHIPPED | OUTPATIENT
Start: 2018-08-15 | End: 2019-11-11

## 2018-08-15 RX ORDER — LANSOPRAZOLE 30 MG/1
30 CAPSULE, DELAYED RELEASE ORAL
Qty: 90 CAP | Refills: 11 | Status: SHIPPED | OUTPATIENT
Start: 2018-08-15 | End: 2018-08-15 | Stop reason: SDUPTHER

## 2018-08-15 RX ORDER — METOPROLOL SUCCINATE 25 MG/1
25 TABLET, EXTENDED RELEASE ORAL DAILY
Qty: 90 TAB | Refills: 3 | Status: SHIPPED | OUTPATIENT
Start: 2018-08-15 | End: 2019-08-28 | Stop reason: SDUPTHER

## 2018-08-15 RX ORDER — METOPROLOL SUCCINATE 25 MG/1
25 TABLET, EXTENDED RELEASE ORAL DAILY
Qty: 90 TAB | Refills: 3 | Status: SHIPPED | OUTPATIENT
Start: 2018-08-15 | End: 2018-08-15 | Stop reason: SDUPTHER

## 2018-08-15 NOTE — MR AVS SNAPSHOT
[address] [place] [address]. [id number] 
[phone] Patient: Sharifa Menon MRN: [id number] YTC:70[birth year]23[id number] Visit Information Date & Time Provider Department Dept. Phone Encounter #  
 8/15/2018 12:30 PM Diana Malloy MD [facility] Sports Medicine and Primary Care [phone] [id number] Follow-up Instructions Return in about 6 months (around 2/15/2019). Follow-up and Disposition History Upcoming Health Maintenance Date Due Influenza Age 5 to Adult 9/15/2018* BREAST CANCER SCRN MAMMOGRAM 4/24/2020 PAP AKA CERVICAL CYTOLOGY 4/24/2021 DTaP/Tdap/Td series (2 - Td) 1/24/2027 COLONOSCOPY 3/28/2027 *Topic was postponed. The date shown is not the original due date. Allergies as of 8/15/2018  Review Complete On: 8/15/2018 By: Diana Malloy MD  
 No Known Allergies Current Immunizations  Never Reviewed No immunizations on file. Not reviewed this visit You Were Diagnosed With   
  
 Codes Comments Essential hypertension    -  Primary ICD-10-CM: I10 
ICD-9-CM: 401.9 Severe obesity (BMI 35.0-39.9) (HCC)     ICD-10-CM: E66.01 
ICD-9-CM: 278.01 Venous insufficiency     ICD-10-CM: I87.2 ICD-9-CM: 459.81 Rotator cuff arthropathy of left shoulder     ICD-10-CM: O61.576 ICD-9-CM: 716.81 Vitals BP Pulse Temp Resp Height(growth percentile) Weight(growth percentile) 130/80 77 97.5 °F (36.4 °C) (Oral) 18 5' 7\" (1.702 m) 236 lb 9.6 oz (107.3 kg) SpO2 BMI OB Status Smoking Status 97% 37.06 kg/m2 Postmenopausal Never Smoker Vitals History BMI and BSA Data Body Mass Index Body Surface Area 37.06 kg/m 2 2.25 m 2 Preferred Pharmacy Pharmacy Name Phone [facility]/PHARMACY #[id number] [phone] Your Updated Medication List  
  
   
 This list is accurate as of 8/15/18  2:07 PM.  Always use your most recent med list.  
  
  
  
  
 lansoprazole 30 mg capsule Commonly known as:  PREVACID Take 1 Cap by mouth Daily (before breakfast). metoprolol succinate 25 mg XL tablet Commonly known as:  TOPROL-XL Take 1 Tab by mouth daily. Prescriptions Sent to Pharmacy Refills  
 lansoprazole (PREVACID) 30 mg capsule 11 Sig: Take 1 Cap by mouth Daily (before breakfast). Class: Normal  
 Pharmacy: [facility]/pharmacy #[id number] Ph #: [phone] Route: Oral  
 metoprolol succinate (TOPROL-XL) 25 mg XL tablet 3 Sig: Take 1 Tab by mouth daily. Class: Normal  
 Pharmacy: [facility]/pharmacy #[id number] Ph #: [phone] Route: Oral  
  
We Performed the Following CBC WITH AUTOMATED DIFF [15137 CPT(R)] COLLECTION VENOUS BLOOD,VENIPUNCTURE [id number] CPT(R)] HEMOGLOBIN A1C WITH EAG [35157 CPT(R)] HEPATITIS PANEL, ACUTE [12561 CPT(R)] HIV 1/2 AG/AB, 4TH GENERATION,W RFLX CONFIRM [id number] CPT(R)] LIPID PANEL [79663 CPT(R)] METABOLIC PANEL, COMPREHENSIVE [24604 CPT(R)] RPR [96532 CPT(R)] TSH 3RD GENERATION [63903 CPT(R)] URINALYSIS W/ RFLX MICROSCOPIC [16601 CPT(R)] Follow-up Instructions Return in about 6 months (around 2/15/2019). To-Do List   
 08/16/2018 10:45 AM  
  Appointment with [facility] [place] WILL 1 at [address] [place] [address] ([phone]) Shower or bathe using soap and water. Do not use deodorant, powder, perfumes, or lotion the day of your exam.  If your prior mammograms were not performed at [facility] 6 please bring films with you or forward prior images 2 days before your procedure.   Check in at registration 15min before your appointment time unless you were instructed to do otherwise. A script is not necessary, but if you have one, please bring it on the day of the mammogram or have it faxed to the department. You are responsible for finding a method of transportation to your appointment. If you don't have transportation, please reschedule your appointment at least 24 hours in advance. [facility] [phone] [facility]  [phone] [facility] 19 PRETTY  [phone] [facility] [phone] [facility] 4865 [facility] [phone] Introducing [facility]! Jeri Hurley introduces Harimata patient portal. Now you can access parts of your medical record, email your doctor's office, and request medication refills online. 1. In your internet browser, go to [email or web address] 2. Click on the First Time User? Click Here link in the Sign In box. You will see the New Member Sign Up page. 3. Enter your Harimata Access Code exactly as it appears below. You will not need to use this code after youve completed the sign-up process. If you do not sign up before the expiration date, you must request a new code. · Harimata Access Code: [email or web address] Expires: 11/7/2018  4:15 PM 
 
4. Enter the last four digits of your Social Security Number (xxxx) and Date of Birth (mm/dd/yyyy) as indicated and click Submit. You will be taken to the next sign-up page. 5. Create a SirionLabst ID. This will be your Harimata login ID and cannot be changed, so think of one that is secure and easy to remember. 6. Create a Harimata password. You can change your password at any time. 7. Enter your Password Reset Question and Answer. This can be used at a later time if you forget your password. 8. Enter your e-mail address. You will receive e-mail notification when new information is available in [address] 9. Click Sign Up. You can now view and download portions of your medical record. 10. Click the Download Summary menu link to download a portable copy of your medical information. If you have questions, please visit the Frequently Asked Questions section of the Cogenic[employer] website. Remember, FortyCloud is NOT to be used for urgent needs. For medical emergencies, dial 911. Now available from your iPhone and Android! Please provide this summary of care documentation to your next provider. Your primary care clinician is listed as Junior Monahan. If you have any questions after today's visit, please call [phone].

## 2018-08-15 NOTE — PROGRESS NOTES
1. Have you been to the ER, urgent care clinic since your last visit? Hospitalized since your last visit? No    2. Have you seen or consulted any other health care providers outside of the [address] [place] [address] since your last visit? Include any pap smears or colon screening.  No     Wants to be tested for STD and pregnacy

## 2018-08-15 NOTE — PROGRESS NOTES
SPORTS MEDICINE AND PRIMARY CARE  Papi Caro MD, [address] [address]  Phone:  [phone]  Fax: [phone]       Chief Complaint   Patient presents with    Hypertension   . SUBJECTIVE:    Sohail Paz is a 62 y.o. female  Dictation on: 08/15/2018  1:31 PM by: Lydia Andrade      Dictation on: 08/15/2018  1:37 PM by: Lydia Andrade            Current Outpatient Prescriptions   Medication Sig Dispense Refill    lansoprazole (PREVACID) 30 mg capsule Take 1 Cap by mouth Daily (before breakfast). 90 Cap 11    metoprolol succinate (TOPROL-XL) 25 mg XL tablet Take 1 Tab by mouth daily.  80 Tab 3     Past Medical History:   Diagnosis Date    Bereavement 14    80 yo mother  - aspiration    Chronically on opiate therapy     Duodenal ulcer 2017    md mojgan    H/O colonoscopy 11    transversve colon    Hypertension     Low back pain     Obesity     Preventative health care     Rectal bleeding     Rhinorrhea     Rotator cuff arthropathy     S/P colonoscopy 2017    diverticulosis - md mojgan    Venous insufficiency      Past Surgical History:   Procedure Laterality Date    COLONOSCOPY N/A 3/28/2017    COLONOSCOPY  performed by Eulalia Membreno MD at [place]     No Known Allergies      REVIEW OF SYSTEMS:  General: negative for - chills or fever  ENT: negative for - headaches, nasal congestion or tinnitus  Respiratory: negative for - cough, hemoptysis, shortness of breath or wheezing  Cardiovascular : negative for - chest pain, edema, palpitations or shortness of breath  Gastrointestinal: negative for - abdominal pain, blood in stools, heartburn or nausea/vomiting  Genito-Urinary: no dysuria, trouble voiding, or hematuria  Musculoskeletal: negative for - gait disturbance, joint pain, joint stiffness or joint swelling  Neurological: no TIA or stroke symptoms  Hematologic: no bruises, no bleeding, no swollen glands  Integument: no lumps, mole changes, nail changes or rash  Endocrine: no malaise/lethargy or unexpected weight changes      Social History     Social History    Marital status: SINGLE     Spouse name: N/A    Number of children: N/A    Years of education: N/A     Social History Main Topics    Smoking status: Never Smoker    Smokeless tobacco: Never Used    Alcohol use Yes      Comment: everyday    Drug use: No    Sexual activity: Yes     Partners: Male     Birth control/ protection: None     Other Topics Concern    None     Social History Narrative    Medical History: 2011 CT abdomen and pelvis 4.5 simple cyst right adnexa otherw ise negative    Surgical History: colonoscopy to transverse colon and referred to gastroenterology 2011    Hospitalization/Major Diagnostic Procedure: Denies Past Hospitalization    Family History: Mother:  79 aspiration Father: deceasedSister(s): aliveBrother(s): aliveSon(s): alive1 brother(s) , 6 sister(s) . 1 son(s) . Social History: Alcohol Use Patient uses alcohol, Drinks per occasion: 1, Drinks per w [place]: 0. Smoking Status Patient is a never    smoker. Marital Status: Single. Lives w ith: alone. Occupation/W ork: employed full time -[employer]. Education/School: elementary    school 8th. Sabianist: no.     Family History   Problem Relation Age of Onset    Diabetes Father     Hypertension Father     Stroke Father        OBJECTIVE:    Visit Vitals    /80    Pulse 77    Temp 97.5 °F (36.4 °C) (Oral)    Resp 18    Ht 5' 7\" (1.702 m)    Wt 236 lb 9.6 oz (107.3 kg)    SpO2 97%    BMI 37.06 kg/m2     CONSTITUTIONAL: well , well nourished, appears age appropriate  EYES: perrla, eom intact  ENMT:moist mucous membranes, pharynx clear  NECK: supple.  Thyroid normal  RESPIRATORY: Chest: clear bilaterally   CARDIOVASCULAR: Heart: regular rate and rhythm  GASTROINTESTINAL: Abdomen: soft, bowel sounds active  HEMATOLOGIC: no pathological lymph nodes palpated  MUSCULOSKELETAL: Extremities: no edema, pulse 1+   INTEGUMENT: No unusual rashes or suspicious skin lesions noted. Nails appear normal.  NEUROLOGIC: non-focal exam   MENTAL STATUS: alert and oriented, appropriate affect           ASSESSMENT:  1. Essential hypertension    2. Severe obesity (BMI 35.0-39.9) (HCC)    3. Venous insufficiency    4. Rotator cuff arthropathy of left shoulder       Dictation on: 08/15/2018  1:37 PM by: Rafa Leigh       I have discussed the diagnosis with the patient and the intended plan as seen in the  orders above. The patient understands and agees with the plan. The patient has   received an after visit summary and questions were answered concerning  future plans  Patient labs and/or xrays were reviewed  Past records were reviewed. PLAN:  .  Orders Placed This Encounter    URINALYSIS W/ RFLX MICROSCOPIC    CBC WITH AUTOMATED DIFF    METABOLIC PANEL, COMPREHENSIVE    LIPID PANEL    TSH 3RD GENERATION    HEMOGLOBIN A1C WITH EAG    HEPATITIS PANEL, ACUTE    HIV 1/2 AG/AB, 4TH GENERATION,W RFLX CONFIRM    RPR    DISCONTD: metoprolol succinate (TOPROL-XL) 25 mg XL tablet    DISCONTD: lansoprazole (PREVACID) 30 mg capsule    lansoprazole (PREVACID) 30 mg capsule    metoprolol succinate (TOPROL-XL) 25 mg XL tablet       Follow-up Disposition:  Return in about 6 months (around 2/15/2019). ATTENTION:   This medical record was transcribed using an electronic medical records system. Although proofread, it may and can contain electronic and spelling errors. Other human spelling and other errors may be present. Corrections may be executed at a later time. Please feel free to contact us for any clarifications as needed.

## 2018-08-16 LAB
ALBUMIN SERPL-MCNC: 4.5 G/DL (ref 3.5–5.5)
ALBUMIN/GLOB SERPL: 1.1 {RATIO} (ref 1.2–2.2)
ALP SERPL-CCNC: 81 IU/L (ref 39–117)
ALT SERPL-CCNC: 14 IU/L (ref 0–32)
APPEARANCE UR: CLEAR
AST SERPL-CCNC: 17 IU/L (ref 0–40)
BACTERIA #/AREA URNS HPF: ABNORMAL /[HPF]
BASOPHILS # BLD AUTO: 0 X10E3/UL (ref 0–0.2)
BASOPHILS NFR BLD AUTO: 1 %
BILIRUB SERPL-MCNC: 0.4 MG/DL (ref 0–1.2)
BILIRUB UR QL STRIP: NEGATIVE
BUN SERPL-MCNC: 14 MG/DL (ref 6–24)
BUN/CREAT SERPL: 18 (ref 9–23)
CALCIUM SERPL-MCNC: 10 MG/DL (ref 8.7–10.2)
CASTS URNS QL MICRO: ABNORMAL /LPF
CHLORIDE SERPL-SCNC: 102 MMOL/L (ref 96–106)
CHOLEST SERPL-MCNC: 216 MG/DL (ref 100–199)
CO2 SERPL-SCNC: 24 MMOL/L (ref 20–29)
COLOR UR: YELLOW
CREAT SERPL-MCNC: 0.79 MG/DL (ref 0.57–1)
EOSINOPHIL # BLD AUTO: 0.2 X10E3/UL (ref 0–0.4)
EOSINOPHIL NFR BLD AUTO: 3 %
EPI CELLS #/AREA URNS HPF: ABNORMAL /HPF
ERYTHROCYTE [DISTWIDTH] IN BLOOD BY AUTOMATED COUNT: 14.1 % (ref 12.3–15.4)
EST. AVERAGE GLUCOSE BLD GHB EST-MCNC: 117 MG/DL
GLOBULIN SER CALC-MCNC: 4.2 G/DL (ref 1.5–4.5)
GLUCOSE SERPL-MCNC: 89 MG/DL (ref 65–99)
GLUCOSE UR QL: NEGATIVE
HAV IGM SERPL QL IA: NEGATIVE
HBA1C MFR BLD: 5.7 % (ref 4.8–5.6)
HBV CORE IGM SERPL QL IA: NEGATIVE
HBV SURFACE AG SERPL QL IA: NEGATIVE
HCT VFR BLD AUTO: 39.3 % (ref 34–46.6)
HCV AB S/CO SERPL IA: 0.1 S/CO RATIO (ref 0–0.9)
HDLC SERPL-MCNC: 60 MG/DL
HGB BLD-MCNC: 12.5 G/DL (ref 11.1–15.9)
HGB UR QL STRIP: ABNORMAL
HIV 1+2 AB+HIV1 P24 AG SERPL QL IA: NON REACTIVE
IMM GRANULOCYTES # BLD: 0 X10E3/UL (ref 0–0.1)
IMM GRANULOCYTES NFR BLD: 0 %
KETONES UR QL STRIP: NEGATIVE
LDLC SERPL CALC-MCNC: 134 MG/DL (ref 0–99)
LEUKOCYTE ESTERASE UR QL STRIP: ABNORMAL
LYMPHOCYTES # BLD AUTO: 2.3 X10E3/UL (ref 0.7–3.1)
LYMPHOCYTES NFR BLD AUTO: 38 %
MCH RBC QN AUTO: 27.8 PG (ref 26.6–33)
MCHC RBC AUTO-ENTMCNC: 31.8 G/DL (ref 31.5–35.7)
MCV RBC AUTO: 87 FL (ref 79–97)
MICRO URNS: ABNORMAL
MONOCYTES # BLD AUTO: 0.6 X10E3/UL (ref 0.1–0.9)
MONOCYTES NFR BLD AUTO: 9 %
MUCOUS THREADS URNS QL MICRO: PRESENT
NEUTROPHILS # BLD AUTO: 3 X10E3/UL (ref 1.4–7)
NEUTROPHILS NFR BLD AUTO: 49 %
NITRITE UR QL STRIP: POSITIVE
PH UR STRIP: 5 [PH] (ref 5–7.5)
PLATELET # BLD AUTO: 253 X10E3/UL (ref 150–379)
POTASSIUM SERPL-SCNC: 5.3 MMOL/L (ref 3.5–5.2)
PROT SERPL-MCNC: 8.7 G/DL (ref 6–8.5)
PROT UR QL STRIP: NEGATIVE
RBC # BLD AUTO: 4.5 X10E6/UL (ref 3.77–5.28)
RBC #/AREA URNS HPF: ABNORMAL /HPF
RPR SER QL: NON REACTIVE
SODIUM SERPL-SCNC: 142 MMOL/L (ref 134–144)
SP GR UR: 1.01 (ref 1–1.03)
TRIGL SERPL-MCNC: 111 MG/DL (ref 0–149)
TSH SERPL DL<=0.005 MIU/L-ACNC: 0.87 UIU/ML (ref 0.45–4.5)
UROBILINOGEN UR STRIP-MCNC: 0.2 MG/DL (ref 0.2–1)
VLDLC SERPL CALC-MCNC: 22 MG/DL (ref 5–40)
WBC # BLD AUTO: 6 X10E3/UL (ref 3.4–10.8)
WBC #/AREA URNS HPF: ABNORMAL /HPF

## 2018-08-18 NOTE — PROGRESS NOTES
The patient returns without new complaints. She is concerned  about the status of her _____ ?? (0.08). The patient is seen for  evaluation.

## 2018-08-18 NOTE — PROGRESS NOTES
The patient's medical status is stable. Appropriate laboratory  studies will be requested. Blood pressure control is at goal.    BMI remains elevated. We encouraged physical activity for 30  minutes 5 days a week and heart-healthy, weight-reducing diet. Her shoulder discomfort seems to have improved. Concerns of  venous insufficiency are unchanged. She will return to the  office in 4 to 6 months or p.r.n.

## 2018-08-19 NOTE — PROGRESS NOTES
The patient returns today with a known history of GERD, duodenal  ulcer, primary hypertension, obesity, rotator cuff arthropathy,  and is seen for evaluation.

## 2019-03-07 ENCOUNTER — OFFICE VISIT (OUTPATIENT)
Dept: INTERNAL MEDICINE CLINIC | Age: 59
End: 2019-03-07

## 2019-03-07 VITALS
SYSTOLIC BLOOD PRESSURE: 142 MMHG | BODY MASS INDEX: 37.35 KG/M2 | WEIGHT: 238 LBS | OXYGEN SATURATION: 99 % | DIASTOLIC BLOOD PRESSURE: 73 MMHG | TEMPERATURE: 95.6 F | RESPIRATION RATE: 18 BRPM | HEIGHT: 67 IN | HEART RATE: 73 BPM

## 2019-03-07 DIAGNOSIS — M54.50 CHRONIC MIDLINE LOW BACK PAIN WITHOUT SCIATICA: ICD-10-CM

## 2019-03-07 DIAGNOSIS — E66.01 SEVERE OBESITY WITH BODY MASS INDEX (BMI) OF 35.0 TO 39.9 WITH SERIOUS COMORBIDITY (HCC): ICD-10-CM

## 2019-03-07 DIAGNOSIS — G89.29 CHRONIC MIDLINE LOW BACK PAIN WITHOUT SCIATICA: ICD-10-CM

## 2019-03-07 DIAGNOSIS — I87.2 VENOUS INSUFFICIENCY: ICD-10-CM

## 2019-03-07 DIAGNOSIS — I10 ESSENTIAL HYPERTENSION: Primary | ICD-10-CM

## 2019-03-07 RX ORDER — CETIRIZINE HCL 10 MG
10 TABLET ORAL
Qty: 30 TAB | Refills: 3 | Status: SHIPPED | OUTPATIENT
Start: 2019-03-07

## 2019-03-07 RX ORDER — AZITHROMYCIN 250 MG/1
250 TABLET, FILM COATED ORAL SEE ADMIN INSTRUCTIONS
Qty: 5 TAB | Refills: 1 | Status: SHIPPED | OUTPATIENT
Start: 2019-03-07 | End: 2019-03-12

## 2019-03-07 RX ORDER — METHOCARBAMOL 750 MG/1
TABLET, FILM COATED ORAL 4 TIMES DAILY
COMMUNITY
End: 2019-09-24 | Stop reason: ALTCHOICE

## 2019-03-07 NOTE — PROGRESS NOTES
SPORTS MEDICINE AND PRIMARY CARE  Telma Linares MD, [address] [address]  Phone:  [phone]  Fax: [phone]       Chief Complaint   Patient presents with    Hypertension     patient complain of congestion   . SUBJECTIVE:    Ken Rain is a 62 y.o. female Patient returns today with known history of primary hypertension, low back pain, obesity, rotator cuff arthropathy, chronic venous insufficiency and is seen for evaluation. Current Outpatient Medications   Medication Sig Dispense Refill    methocarbamol (ROBAXIN) 750 mg tablet Take  by mouth four (4) times daily.  azithromycin (ZITHROMAX) 250 mg tablet Take 1 Tab by mouth See Admin Instructions for 5 days. 5 Tab 1    cetirizine (ZYRTEC) 10 mg tablet Take 1 Tab by mouth daily as needed for Allergies. 30 Tab 3    lansoprazole (PREVACID) 30 mg capsule Take 1 Cap by mouth Daily (before breakfast). 90 Cap 11    metoprolol succinate (TOPROL-XL) 25 mg XL tablet Take 1 Tab by mouth daily.  80 Tab 3     Past Medical History:   Diagnosis Date    Bereavement 14    78 yo mother  - aspiration    Chronically on opiate therapy     Duodenal ulcer 2017    md mojgan    H/O colonoscopy 11    transversve colon    Hypertension     Low back pain     Obesity     Preventative health care     Rectal bleeding     Rhinorrhea     Rotator cuff arthropathy     S/P colonoscopy 2017    diverticulosis - md mojgan    Venous insufficiency      Past Surgical History:   Procedure Laterality Date    COLONOSCOPY N/A 3/28/2017    COLONOSCOPY  performed by Onel Hilliard MD at [facility] MA[facility]     Allergies   Allergen Reactions    Shrimp Itching     Throat itching         REVIEW OF SYSTEMS:  General: negative for - chills or fever  ENT: negative for - headaches, nasal congestion or tinnitus  Respiratory: negative for - cough, hemoptysis, shortness of breath or wheezing  Cardiovascular : negative for - chest pain, edema, palpitations or shortness of breath  Gastrointestinal: negative for - abdominal pain, blood in stools, heartburn or nausea/vomiting  Genito-Urinary: no dysuria, trouble voiding, or hematuria  Musculoskeletal: negative for - gait disturbance, joint pain, joint stiffness or joint swelling  Neurological: no TIA or stroke symptoms  Hematologic: no bruises, no bleeding, no swollen glands  Integument: no lumps, mole changes, nail changes or rash  Endocrine: no malaise/lethargy or unexpected weight changes      Social History     Socioeconomic History    Marital status: SINGLE     Spouse name: Not on file    Number of children: Not on file    Years of education: Not on file    Highest education level: Not on file   Tobacco Use    Smoking status: Never Smoker    Smokeless tobacco: Never Used   Substance and Sexual Activity    Alcohol use: Yes     Comment: everyday    Drug use: No    Sexual activity: Yes     Partners: Male     Birth control/protection: None   Social History Narrative    Medical History: 2011 CT abdomen and pelvis 4.5 simple cyst right adnexa otherw ise negative    Surgical History: colonoscopy to transverse colon and referred to gastroenterology 2011    Hospitalization/Major Diagnostic Procedure: Denies Past Hospitalization    Family History: Mother:  79 aspiration Father: deceasedSister(s): aliveBrother(s): aliveSon(s): alive1 brother(s) , 6 sister(s) . 1 son(s) . Social History: Alcohol Use Patient uses alcohol, Drinks per occasion: 1, Drinks per w [place]: 0. Smoking Status Patient is a never    smoker. Marital Status: Single. Lives w ith: alone. Occupation/W ork: employed full time -[employer] . Education/School: elementary    school 8th.  Confucianist: no.     Family History   Problem Relation Age of Onset    Diabetes Father     Hypertension Father     Stroke Father        OBJECTIVE:    Visit Vitals  /73 (BP 1 Location: Right arm, BP Patient Position: Sitting)   Pulse 73   Temp 95.6 °F (35.3 °C) (Oral)   Resp 18   Ht 5' 7\" (1.702 m)   Wt 238 lb (108 kg)   SpO2 99%   BMI 37.28 kg/m²     CONSTITUTIONAL: well , well nourished, appears age appropriate  EYES: perrla, eom intact  ENMT:moist mucous membranes, pharynx clear  NECK: supple. Thyroid normal  RESPIRATORY: Chest: clear bilaterally   CARDIOVASCULAR: Heart: regular rate and rhythm  GASTROINTESTINAL: Abdomen: soft, bowel sounds active  HEMATOLOGIC: no pathological lymph nodes palpated  MUSCULOSKELETAL: Extremities: no edema, pulse 1+   INTEGUMENT: No unusual rashes or suspicious skin lesions noted. Nails appear normal.  NEUROLOGIC: non-focal exam   MENTAL STATUS: alert and oriented, appropriate affect           ASSESSMENT:  1. Essential hypertension    2. Venous insufficiency    3. Severe obesity with body mass index (BMI) of 35.0 to 39.9 with serious comorbidity ([address]    4. Chronic midline low back pain without sciatica      The symptoms suggest she may have had an infectious process with mucopurulent drainage and the duration would suggest it is allergies. Will treat her for both. Since we last saw her she had an episode of some swelling, as well as sensation her throat was swelling, after she had some shrimp. She went to the ER and was given Benadryl and steroids. This would suggest that she is probably allergic to at least shrimp and maybe allergic to shellfish and give her that precaution. We note that the BMI remains elevated and she has gained 2 lbs since we last saw her. We suggest she adhere to a heart healthy, weight reducing diet and begin physical activity for 30 minutes five days a week. She will be back to see us in six months, sooner if she has any problems. Discussed the patient's BMI with her.   The BMI follow up plan is as follows:     dietary management education, guidance, and counseling  encourage exercise  monitor weight  prescribed dietary intake    I have discussed the diagnosis with the patient and the intended plan as seen in the  orders above. The patient understands and agees with the plan. The patient has   received an after visit summary and questions were answered concerning  future plans  Patient labs and/or xrays were reviewed  Past records were reviewed. PLAN:  .  Orders Placed This Encounter    methocarbamol (ROBAXIN) 750 mg tablet    azithromycin (ZITHROMAX) 250 mg tablet    cetirizine (ZYRTEC) 10 mg tablet       Follow-up Disposition:  Return in about 6 months (around 9/7/2019). ATTENTION:   This medical record was transcribed using an electronic medical records system. Although proofread, it may and can contain electronic and spelling errors. Other human spelling and other errors may be present. Corrections may be executed at a later time. Please feel free to contact us for any clarifications as needed.

## 2019-03-07 NOTE — PROGRESS NOTES
1. Have you been to the ER, urgent care clinic since your last visit? Hospitalized since your last visit? Yes Reason for visit: allergic reaction to srimp    2. Have you seen or consulted any other health care providers outside of the [address] [place] [address] since your last visit? Include any pap smears or colon screening.  Yes Where: [facility]

## 2019-03-07 NOTE — PATIENT INSTRUCTIONS
Body Mass Index: Care Instructions  Your Care Instructions    Body mass index (BMI) can help you see if your weight is raising your risk for health problems. It uses a formula to compare how much you weigh with how tall you are. · A BMI lower than 18.5 is considered underweight. · A BMI between 18.5 and 24.9 is considered healthy. · A BMI between 25 and 29.9 is considered overweight. A BMI of 30 or higher is considered obese. If your BMI is in the normal range, it means that you have a lower risk for weight-related health problems. If your BMI is in the overweight or obese range, you may be at increased risk for weight-related health problems, such as high blood pressure, heart disease, stroke, arthritis or joint pain, and diabetes. If your BMI is in the underweight range, you may be at increased risk for health problems such as fatigue, lower protection (immunity) against illness, muscle loss, bone loss, hair loss, and hormone problems. BMI is just one measure of your risk for weight-related health problems. You may be at higher risk for health problems if you are not active, you eat an unhealthy diet, or you drink too much alcohol or use tobacco products. Follow-up care is a key part of your treatment and safety. Be sure to make and go to all appointments, and call your doctor if you are having problems. It's also a good idea to know your test results and keep a list of the medicines you take. How can you care for yourself at home? · Practice healthy eating habits. This includes eating plenty of fruits, vegetables, whole grains, lean protein, and low-fat dairy. · If your doctor recommends it, get more exercise. Walking is a good choice. Bit by bit, increase the amount you walk every day. Try for at least 30 minutes on most days of the week. · Do not smoke. Smoking can increase your risk for health problems. If you need help quitting, talk to your doctor about stop-smoking programs and medicines. These can increase your chances of quitting for good. · Limit alcohol to 2 drinks a day for men and 1 drink a day for women. Too much alcohol can cause health problems. If you have a BMI higher than 25  · Your doctor may do other tests to check your risk for weight-related health problems. This may include measuring the distance around your waist. A waist measurement of more than 40 inches in men or 35 inches in women can increase the risk of weight-related health problems. · Talk with your doctor about steps you can take to stay healthy or improve your health. You may need to make lifestyle changes to lose weight and stay healthy, such as changing your diet and getting regular exercise. If you have a BMI lower than 18.5  · Your doctor may do other tests to check your risk for health problems. · Talk with your doctor about steps you can take to stay healthy or improve your health. You may need to make lifestyle changes to gain or maintain weight and stay healthy, such as getting more healthy foods in your diet and doing exercises to build muscle. Where can you learn more? Go to http://mallory-john.info/. Enter S176 in the search box to learn more about \"Body Mass Index: Care Instructions. \"  Current as of: October 13, 2016  Content Version: 11.4  © 8290-5484 [employer]. Care instructions adapted under license by [employer] (which disclaims liability or warranty for this information). If you have questions about a medical condition or this instruction, always ask your healthcare professional. [address] any warranty or liability for your use of this information.

## 2019-08-28 RX ORDER — METOPROLOL SUCCINATE 25 MG/1
TABLET, EXTENDED RELEASE ORAL
Qty: 90 TAB | Refills: 3 | Status: SHIPPED | OUTPATIENT
Start: 2019-08-28 | End: 2020-09-07

## 2019-09-24 ENCOUNTER — OFFICE VISIT (OUTPATIENT)
Dept: INTERNAL MEDICINE CLINIC | Age: 59
End: 2019-09-24

## 2019-09-24 VITALS
WEIGHT: 246.4 LBS | HEIGHT: 67 IN | TEMPERATURE: 96.1 F | SYSTOLIC BLOOD PRESSURE: 141 MMHG | DIASTOLIC BLOOD PRESSURE: 74 MMHG | HEART RATE: 71 BPM | OXYGEN SATURATION: 99 % | BODY MASS INDEX: 38.67 KG/M2 | RESPIRATION RATE: 18 BRPM

## 2019-09-24 DIAGNOSIS — I87.2 VENOUS INSUFFICIENCY: ICD-10-CM

## 2019-09-24 DIAGNOSIS — G89.29 CHRONIC MIDLINE LOW BACK PAIN WITHOUT SCIATICA: ICD-10-CM

## 2019-09-24 DIAGNOSIS — I10 ESSENTIAL HYPERTENSION: Primary | ICD-10-CM

## 2019-09-24 DIAGNOSIS — G62.9 NEUROPATHY: ICD-10-CM

## 2019-09-24 DIAGNOSIS — E66.01 SEVERE OBESITY WITH BODY MASS INDEX (BMI) OF 35.0 TO 39.9 WITH SERIOUS COMORBIDITY (HCC): ICD-10-CM

## 2019-09-24 DIAGNOSIS — M54.50 CHRONIC MIDLINE LOW BACK PAIN WITHOUT SCIATICA: ICD-10-CM

## 2019-09-24 NOTE — PATIENT INSTRUCTIONS
Body Mass Index: Care Instructions Your Care Instructions Body mass index (BMI) can help you see if your weight is raising your risk for health problems. It uses a formula to compare how much you weigh with how tall you are. · A BMI lower than 18.5 is considered underweight. · A BMI between 18.5 and 24.9 is considered healthy. · A BMI between 25 and 29.9 is considered overweight. A BMI of 30 or higher is considered obese. If your BMI is in the normal range, it means that you have a lower risk for weight-related health problems. If your BMI is in the overweight or obese range, you may be at increased risk for weight-related health problems, such as high blood pressure, heart disease, stroke, arthritis or joint pain, and diabetes. If your BMI is in the underweight range, you may be at increased risk for health problems such as fatigue, lower protection (immunity) against illness, muscle loss, bone loss, hair loss, and hormone problems. BMI is just one measure of your risk for weight-related health problems. You may be at higher risk for health problems if you are not active, you eat an unhealthy diet, or you drink too much alcohol or use tobacco products. Follow-up care is a key part of your treatment and safety. Be sure to make and go to all appointments, and call your doctor if you are having problems. It's also a good idea to know your test results and keep a list of the medicines you take. How can you care for yourself at home? · Practice healthy eating habits. This includes eating plenty of fruits, vegetables, whole grains, lean protein, and low-fat dairy. · If your doctor recommends it, get more exercise. Walking is a good choice. Bit by bit, increase the amount you walk every day. Try for at least 30 minutes on most days of the week. · Do not smoke. Smoking can increase your risk for health problems.  If you need help quitting, talk to your doctor about stop-smoking programs and medicines. These can increase your chances of quitting for good. · Limit alcohol to 2 drinks a day for men and 1 drink a day for women. Too much alcohol can cause health problems. If you have a BMI higher than 25 · Your doctor may do other tests to check your risk for weight-related health problems. This may include measuring the distance around your waist. A waist measurement of more than 40 inches in men or 35 inches in women can increase the risk of weight-related health problems. · Talk with your doctor about steps you can take to stay healthy or improve your health. You may need to make lifestyle changes to lose weight and stay healthy, such as changing your diet and getting regular exercise. If you have a BMI lower than 18.5 · Your doctor may do other tests to check your risk for health problems. · Talk with your doctor about steps you can take to stay healthy or improve your health. You may need to make lifestyle changes to gain or maintain weight and stay healthy, such as getting more healthy foods in your diet and doing exercises to build muscle. Where can you learn more? Go to http://mallory-john.info/. Enter S176 in the search box to learn more about \"Body Mass Index: Care Instructions. \" Current as of: October 13, 2016 Content Version: 11.4 © 8845-1641 [employer]wise[employer]. Care instructions adapted under license by [facility] (which disclaims liability or warranty for this information). If you have questions about a medical condition or this instruction, always ask your healthcare professional. [address] any warranty or liability for your use of this information.

## 2019-09-24 NOTE — PROGRESS NOTES
1. Have you been to the ER, urgent care clinic since your last visit? Hospitalized since your last visit? No    2. Have you seen or consulted any other health care providers outside of the [address] [place] [address] since your last visit? Include any pap smears or colon screening.  No

## 2019-09-24 NOTE — PROGRESS NOTES
SPORTS MEDICINE AND PRIMARY CARE  Marshall Reich MD, [address] [address]  Phone:  [phone]  Fax: [phone]       Chief Complaint   Patient presents with    Hypertension   . SUBJECTIVE:    Jc Rae is a 62 y.o. female Patient returns today with known history of primary hypertension, obesity, venous insufficiency, low back pain, and is seen for evaluation. Burning of her feet bilaterally intermittently. She is a  at work and is on her feet all the time. Wonders if that is a contributing factor. Her son is 32years old now and has become a [address] witness, so she does not _____________. Current Outpatient Medications   Medication Sig Dispense Refill    metoprolol succinate (TOPROL-XL) 25 mg XL tablet TAKE 1 TABLET BY MOUTH EVERY DAY 90 Tab 3    cetirizine (ZYRTEC) 10 mg tablet Take 1 Tab by mouth daily as needed for Allergies. 30 Tab 3    lansoprazole (PREVACID) 30 mg capsule Take 1 Cap by mouth Daily (before breakfast).  80 Cap 11     Past Medical History:   Diagnosis Date    Bereavement 14    80 yo mother  - aspiration    Chronically on opiate therapy     Duodenal ulcer 2017    md mojgan    H/O colonoscopy 11    transversve colon    Hypertension     Low back pain     Neuropathy     Obesity     Preventative health care     Rectal bleeding     Rhinorrhea     Rotator cuff arthropathy     S/P colonoscopy 2017    diverticulosis - md mojgan    Venous insufficiency      Past Surgical History:   Procedure Laterality Date    COLONOSCOPY N/A 3/28/2017    COLONOSCOPY  performed by Tenzin Godfrey [facility] at [facility] MAIN OR     Allergies   Allergen Reactions    Shrimp Itching     Throat itching         REVIEW OF SYSTEMS:  General: negative for - chills or fever  ENT: negative for - headaches, nasal congestion or tinnitus  Respiratory: negative for - cough, hemoptysis, shortness of breath or wheezing  Cardiovascular Jess Ortega negative for - chest pain, edema, palpitations or shortness of breath  Gastrointestinal: negative for - abdominal pain, blood in stools, heartburn or nausea/vomiting  Genito-Urinary: no dysuria, trouble voiding, or hematuria  Musculoskeletal: negative for - gait disturbance, joint pain, joint stiffness or joint swelling  Neurological: no TIA or stroke symptoms  Hematologic: no bruises, no bleeding, no swollen glands  Integument: no lumps, mole changes, nail changes or rash  Endocrine: no malaise/lethargy or unexpected weight changes      Social History     Socioeconomic History    Marital status: SINGLE     Spouse name: Not on file    Number of children: Not on file    Years of education: Not on file    Highest education level: Not on file   Tobacco Use    Smoking status: Never Smoker    Smokeless tobacco: Never Used   Substance and Sexual Activity    Alcohol use: Yes     Comment: everyday    Drug use: No    Sexual activity: Yes     Partners: Male     Birth control/protection: None   Social History Narrative    Medical History: 2011 CT abdomen and pelvis 4.5 simple cyst right adnexa otherw ise negative    Surgical History: colonoscopy - md mojgan    Hospitalization/Major Diagnostic Procedure: Denies Past Hospitalization    Family History: Mother:  79 aspiration Father: deceasedSister(s): aliveBrother(s): aliveSon(s): alive1 brother(s) , 6 sister(s) . 31 son(s) . Social History: Alcohol Use Patient uses alcohol, Drinks per occasion: 1, Drinks per w [place]: 0. Smoking Status Patient is a never    smoker. Marital Status: Single. Lives w ith: alone. Occupation/W ork: employed full time -[employer] . Education/School: elementary    school 8th.  Shinto: no.     Family History   Problem Relation Age of Onset    Diabetes Father     Hypertension Father     Stroke Father        OBJECTIVE:    Visit Vitals  /74 (BP 1 Location: Right arm, BP Patient Position: Sitting) Pulse 71   Temp 96.1 °F (35.6 °C) (Oral)   Resp 18   Ht 5' 7\" (1.702 m)   Wt 246 lb 6.4 oz (111.8 kg)   SpO2 99%   BMI 38.59 kg/m²     CONSTITUTIONAL: well , well nourished, appears age appropriate  EYES: perrla, eom intact  ENMT:moist mucous membranes, pharynx clear  NECK: supple. Thyroid normal  RESPIRATORY: Chest: clear bilaterally   CARDIOVASCULAR: Heart: regular rate and rhythm  GASTROINTESTINAL: Abdomen: soft, bowel sounds active  HEMATOLOGIC: no pathological lymph nodes palpated  MUSCULOSKELETAL: Extremities: no edema, pulse 1+   INTEGUMENT: No unusual rashes or suspicious skin lesions noted. Nails appear normal.  NEUROLOGIC: non-focal exam   MENTAL STATUS: alert and oriented, appropriate affect           ASSESSMENT:  1. Essential hypertension    2. Severe obesity with body mass index (BMI) of 35.0 to 39.9 with serious comorbidity (HCC)    3. Venous insufficiency    4. Chronic midline low back pain without sciatica    5. Neuropathy      BP control is at goal on current medication, no adjustments will be made. We are a little concerned as she has made a decision to go AMA and gain another 8 pounds since we last saw her. We were hoping she would get out of the [place]. We encouraged physical activity and a heart healthy, weight reducing diet. Chronic venous insufficiency is not a particular problem. The burning on her feet is related to neuropathy. Will check a B12 level to rule out other pathology, as well as hemoglobin A1c. She will be back to see us in about a year, sooner if she has any problems. She is reminded she can walk in at any time should she have an issue and unable to get an appointment. Will send her results of the lab studies in the mail. I have discussed the diagnosis with the patient and the intended plan as seen in the  orders above. The patient understands and agees with the plan.   The patient has   received an after visit summary and questions were answered concerning  future plans  Patient labs and/or xrays were reviewed  Past records were reviewed. PLAN:  .  Orders Placed This Encounter    WILL MAMMO BI SCREENING INCL CAD    URINALYSIS W/ RFLX MICROSCOPIC    CBC WITH AUTOMATED DIFF    METABOLIC PANEL, COMPREHENSIVE    LIPID PANEL    TSH 3RD GENERATION    HEMOGLOBIN A1C WITH EAG    VITAMIN B12 & FOLATE       Follow-up and Dispositions    · Return in about 1 year (around 9/24/2020). ATTENTION:   This medical record was transcribed using an electronic medical records system. Although proofread, it may and can contain electronic and spelling errors. Other human spelling and other errors may be present. Corrections may be executed at a later time. Please feel free to contact us for any clarifications as needed. Discussed the patient's BMI with her. The BMI follow up plan is as follows:     dietary management education, guidance, and counseling  encourage exercise  monitor weight  prescribed dietary intake    An After Visit Summary was printed and given to the patient.

## 2019-09-26 LAB
ALBUMIN SERPL-MCNC: 4.7 G/DL (ref 3.5–5.5)
ALBUMIN/GLOB SERPL: 1.2 {RATIO} (ref 1.2–2.2)
ALP SERPL-CCNC: 91 IU/L (ref 39–117)
ALT SERPL-CCNC: 14 IU/L (ref 0–32)
APPEARANCE UR: CLEAR
AST SERPL-CCNC: 22 IU/L (ref 0–40)
BACTERIA #/AREA URNS HPF: ABNORMAL /[HPF]
BASOPHILS # BLD AUTO: 0.1 X10E3/UL (ref 0–0.2)
BASOPHILS NFR BLD AUTO: 1 %
BILIRUB SERPL-MCNC: 0.3 MG/DL (ref 0–1.2)
BILIRUB UR QL STRIP: NEGATIVE
BUN SERPL-MCNC: 17 MG/DL (ref 6–24)
BUN/CREAT SERPL: 18 (ref 9–23)
CALCIUM SERPL-MCNC: 9.9 MG/DL (ref 8.7–10.2)
CASTS URNS QL MICRO: ABNORMAL /LPF
CHLORIDE SERPL-SCNC: 105 MMOL/L (ref 96–106)
CHOLEST SERPL-MCNC: 211 MG/DL (ref 100–199)
CO2 SERPL-SCNC: 21 MMOL/L (ref 20–29)
COLOR UR: YELLOW
CREAT SERPL-MCNC: 0.93 MG/DL (ref 0.57–1)
EOSINOPHIL # BLD AUTO: 0.3 X10E3/UL (ref 0–0.4)
EOSINOPHIL NFR BLD AUTO: 5 %
EPI CELLS #/AREA URNS HPF: ABNORMAL /HPF (ref 0–10)
ERYTHROCYTE [DISTWIDTH] IN BLOOD BY AUTOMATED COUNT: 15.5 % (ref 12.3–15.4)
EST. AVERAGE GLUCOSE BLD GHB EST-MCNC: 123 MG/DL
FOLATE SERPL-MCNC: >20 NG/ML
GLOBULIN SER CALC-MCNC: 3.8 G/DL (ref 1.5–4.5)
GLUCOSE SERPL-MCNC: 84 MG/DL (ref 65–99)
GLUCOSE UR QL: NEGATIVE
HBA1C MFR BLD: 5.9 % (ref 4.8–5.6)
HCT VFR BLD AUTO: 36.1 % (ref 34–46.6)
HDLC SERPL-MCNC: 63 MG/DL
HGB BLD-MCNC: 11.8 G/DL (ref 11.1–15.9)
HGB UR QL STRIP: NEGATIVE
IMM GRANULOCYTES # BLD AUTO: 0 X10E3/UL (ref 0–0.1)
IMM GRANULOCYTES NFR BLD AUTO: 1 %
KETONES UR QL STRIP: NEGATIVE
LDLC SERPL CALC-MCNC: 119 MG/DL (ref 0–99)
LEUKOCYTE ESTERASE UR QL STRIP: ABNORMAL
LYMPHOCYTES # BLD AUTO: 2.4 X10E3/UL (ref 0.7–3.1)
LYMPHOCYTES NFR BLD AUTO: 36 %
MCH RBC QN AUTO: 26.9 PG (ref 26.6–33)
MCHC RBC AUTO-ENTMCNC: 32.7 G/DL (ref 31.5–35.7)
MCV RBC AUTO: 82 FL (ref 79–97)
MICRO URNS: ABNORMAL
MONOCYTES # BLD AUTO: 0.7 X10E3/UL (ref 0.1–0.9)
MONOCYTES NFR BLD AUTO: 11 %
MUCOUS THREADS URNS QL MICRO: PRESENT
NEUTROPHILS # BLD AUTO: 3.1 X10E3/UL (ref 1.4–7)
NEUTROPHILS NFR BLD AUTO: 46 %
NITRITE UR QL STRIP: POSITIVE
PH UR STRIP: 5.5 [PH] (ref 5–7.5)
PLATELET # BLD AUTO: 264 X10E3/UL (ref 150–450)
POTASSIUM SERPL-SCNC: 4.8 MMOL/L (ref 3.5–5.2)
PROT SERPL-MCNC: 8.5 G/DL (ref 6–8.5)
PROT UR QL STRIP: NEGATIVE
RBC # BLD AUTO: 4.38 X10E6/UL (ref 3.77–5.28)
RBC #/AREA URNS HPF: ABNORMAL /HPF (ref 0–2)
SODIUM SERPL-SCNC: 146 MMOL/L (ref 134–144)
SP GR UR: 1.01 (ref 1–1.03)
TRIGL SERPL-MCNC: 143 MG/DL (ref 0–149)
TSH SERPL DL<=0.005 MIU/L-ACNC: 0.99 UIU/ML (ref 0.45–4.5)
UROBILINOGEN UR STRIP-MCNC: 0.2 MG/DL (ref 0.2–1)
VIT B12 SERPL-MCNC: 626 PG/ML (ref 232–1245)
VLDLC SERPL CALC-MCNC: 29 MG/DL (ref 5–40)
WBC # BLD AUTO: 6.6 X10E3/UL (ref 3.4–10.8)
WBC #/AREA URNS HPF: ABNORMAL /HPF (ref 0–5)

## 2019-11-11 RX ORDER — LANSOPRAZOLE 30 MG/1
CAPSULE, DELAYED RELEASE ORAL
Qty: 90 CAP | Refills: 11 | Status: SHIPPED | OUTPATIENT
Start: 2019-11-11 | End: 2020-12-15

## 2020-10-08 ENCOUNTER — OFFICE VISIT (OUTPATIENT)
Dept: INTERNAL MEDICINE CLINIC | Age: 60
End: 2020-10-08
Payer: COMMERCIAL

## 2020-10-08 VITALS
DIASTOLIC BLOOD PRESSURE: 84 MMHG | WEIGHT: 252.9 LBS | HEIGHT: 67 IN | SYSTOLIC BLOOD PRESSURE: 145 MMHG | HEART RATE: 66 BPM | OXYGEN SATURATION: 96 % | RESPIRATION RATE: 16 BRPM | BODY MASS INDEX: 39.69 KG/M2 | TEMPERATURE: 97.7 F

## 2020-10-08 DIAGNOSIS — I87.2 VENOUS INSUFFICIENCY: ICD-10-CM

## 2020-10-08 DIAGNOSIS — E66.01 SEVERE OBESITY WITH BODY MASS INDEX (BMI) OF 35.0 TO 39.9 WITH SERIOUS COMORBIDITY (HCC): ICD-10-CM

## 2020-10-08 DIAGNOSIS — M12.812 ROTATOR CUFF ARTHROPATHY OF LEFT SHOULDER: ICD-10-CM

## 2020-10-08 DIAGNOSIS — I10 ESSENTIAL HYPERTENSION: Primary | ICD-10-CM

## 2020-10-08 DIAGNOSIS — G62.9 NEUROPATHY: ICD-10-CM

## 2020-10-08 PROCEDURE — 99214 OFFICE O/P EST MOD 30 MIN: CPT | Performed by: INTERNAL MEDICINE

## 2020-10-08 NOTE — PROGRESS NOTES
1. Have you been to the ER, urgent care clinic since your last visit? Hospitalized since your last visit? No    2. Have you seen or consulted any other health care providers outside of the [address] [place] [address] since your last visit? Include any pap smears or colon screening.  No    Medication refill

## 2020-10-08 NOTE — PROGRESS NOTES
SPORTS MEDICINE AND PRIMARY CARE  Jen Zuleta MD, [address] [address]  Phone:  [phone]  Fax: [phone]       Chief Complaint   Patient presents with    Hypertension   . SUBJECTIVE:    Valentin Leung is a 61 y.o. female Patient returns today with a known history of primary hypertension, venous insufficiency, rotator cuff arthropathy, neuropathy, obesity, and is seen for evaluation. Current Outpatient Medications   Medication Sig Dispense Refill    metoprolol succinate (TOPROL-XL) 25 mg XL tablet TAKE 1 TABLET BY MOUTH EVERY DAY 30 Tab 0    lansoprazole (PREVACID) 30 mg capsule TAKE 1 CAPSULE BY MOUTH EVERY DAY BEFORE BREAKFAST 90 Cap 11    cetirizine (ZYRTEC) 10 mg tablet Take 1 Tab by mouth daily as needed for Allergies.  27 Tab 3     Past Medical History:   Diagnosis Date    Bereavement 14    80 yo mother  - aspiration    Chronically on opiate therapy     Duodenal ulcer 2017    md mojgan    H/O colonoscopy 11    transversve colon    Hypertension     Low back pain     Neuropathy     Obesity     Preventative health care     Rectal bleeding     Rhinorrhea     Rotator cuff arthropathy     S/P colonoscopy 2017    diverticulosis - md mojgan    Venous insufficiency      Past Surgical History:   Procedure Laterality Date    COLONOSCOPY N/A 3/28/2017    COLONOSCOPY  performed by Casi Lombardi MD at [address] [place] [address] OR     Allergies   Allergen Reactions    Shrimp Itching     Throat itching         REVIEW OF SYSTEMS:  General: negative for - chills or fever  ENT: negative for - headaches, nasal congestion or tinnitus  Respiratory: negative for - cough, hemoptysis, shortness of breath or wheezing  Cardiovascular : negative for - chest pain, edema, palpitations or shortness of breath  Gastrointestinal: negative for - abdominal pain, blood in stools, heartburn or nausea/vomiting  Genito-Urinary: no dysuria, trouble voiding, or hematuria  Musculoskeletal: negative for - gait disturbance, joint pain, joint stiffness or joint swelling  Neurological: no TIA or stroke symptoms  Hematologic: no bruises, no bleeding, no swollen glands  Integument: no lumps, mole changes, nail changes or rash  Endocrine: no malaise/lethargy or unexpected weight changes      Social History     Socioeconomic History    Marital status: SINGLE     Spouse name: Not on file    Number of children: Not on file    Years of education: Not on file    Highest education level: Not on file   Tobacco Use    Smoking status: Never Smoker    Smokeless tobacco: Never Used   Substance and Sexual Activity    Alcohol use: Yes     Comment: daily    Drug use: No    Sexual activity: Yes     Partners: Male     Birth control/protection: None   Social History Narrative    Medical History: 2011 CT abdomen and pelvis 4.5 simple cyst right adnexa otherw ise negative    Surgical History: colonoscopy - md mojgan        Hospitalization/Major Diagnostic Procedure: Denies Past Hospitalization    Family History: Mother:  79 aspiration Father: deceasedSister(s): aliveBrother(s): aliveSon(s): alive1 brother(s) , 6 sister(s) . 32 son  . Social History: Alcohol Use Patient uses alcohol, Drinks per occasion: 1, Drinks per w [place]: 0. Smoking Status Patient is a never smoker        . Marital Status: Single. Lives w ith: alone. Occupation/W ork: employed full time -[employer]. Education/School: elementary school 8th.  Zoroastrianism: no.     Family History   Problem Relation Age of Onset    Diabetes Father     Hypertension Father     Stroke Father        OBJECTIVE:    Visit Vitals  BP (!) 145/84   Pulse 66   Temp 97.7 °F (36.5 °C) (Oral)   Resp 16   Ht 5' 7\" (1.702 m)   Wt 252 lb 14.4 oz (114.7 kg)   SpO2 96%   BMI 39.61 kg/m²     CONSTITUTIONAL: well , well nourished, appears age appropriate  EYES: perrla, eom intact  ENMT:moist mucous membranes, pharynx clear  NECK: supple. Thyroid normal  RESPIRATORY: Chest: clear bilaterally   CARDIOVASCULAR: Heart: regular rate and rhythm  GASTROINTESTINAL: Abdomen: soft, bowel sounds active  HEMATOLOGIC: no pathological lymph nodes palpated  MUSCULOSKELETAL: Extremities: no edema, pulse 1+   INTEGUMENT: No unusual rashes or suspicious skin lesions noted. Nails appear normal.  NEUROLOGIC: non-focal exam   MENTAL STATUS: alert and oriented, appropriate affect           ASSESSMENT:  1. Essential hypertension    2. Venous insufficiency    3. Rotator cuff arthropathy of left shoulder    4. Neuropathy    5. Severe obesity with body mass index (BMI) of 35.0 to 39.9 with serious comorbidity ([facility] [address]      Patient's blood pressure control is lacking. She does not have the money to pay the co-pay for her blood pressure medicines. She will get them in a week, she states. Chronic venous insufficiency is not symptomatic currently. Rotator cuff arthropathy on the left is tolerable now. She has a known history of neuropathy and has a numbness in the index finger tip on the left hand. That could be the beginnings of carpal tunnel syndrome. Obesity remains a concern and since we last saw her, she has made a decision to gain 6 pounds. We encourage a heart healthy, weight reducing diet. She will be back to see us in about four months, sooner if she has any problems. I have discussed the diagnosis with the patient and the intended plan as seen in the  Orders. The patient understands and agees with the plan. The patient has   received an after visit summary and questions were answered concerning  future plans  Patient labs and/or xrays were reviewed  Past records were reviewed. PLAN:  . No orders of the defined types were placed in this encounter. Follow-up and Dispositions    · Return in about 4 months (around 2/8/2021).                 ATTENTION:   This medical record was transcribed using an electronic medical records system. Although proofread, it may and can contain electronic and spelling errors. Other human spelling and other errors may be present. Corrections may be executed at a later time. Please feel free to contact us for any clarifications as needed.

## 2020-10-13 RX ORDER — METOPROLOL SUCCINATE 25 MG/1
TABLET, EXTENDED RELEASE ORAL
Qty: 90 TAB | Refills: 3 | Status: SHIPPED | OUTPATIENT
Start: 2020-10-13 | End: 2021-08-02

## 2020-10-26 ENCOUNTER — TRANSCRIBE ORDER (OUTPATIENT)
Dept: SCHEDULING | Age: 60
End: 2020-10-26

## 2020-10-26 DIAGNOSIS — Z12.31 VISIT FOR SCREENING MAMMOGRAM: Primary | ICD-10-CM

## 2020-12-15 RX ORDER — LANSOPRAZOLE 30 MG/1
CAPSULE, DELAYED RELEASE ORAL
Qty: 90 CAP | Refills: 10 | Status: SHIPPED | OUTPATIENT
Start: 2020-12-15 | End: 2021-04-01 | Stop reason: SDUPTHER

## 2021-03-10 ENCOUNTER — NURSE TRIAGE (OUTPATIENT)
Dept: OTHER | Facility: CLINIC | Age: 61
End: 2021-03-10

## 2021-03-10 NOTE — TELEPHONE ENCOUNTER
[email or web address]    Reason for Disposition   COVID-19 vaccine, Frequently Asked Questions (FAQs)    Answer Assessment - Initial Assessment Questions  1. MAIN CONCERN OR SYMPTOM:  \"What is your main concern right now? \" \"What question do you have? \" \"What's the main symptom you're worried about? \" (e.g., fever, pain, redness, swelling)      Worried about side effects secondary to obesity    2. VACCINE: \"What vaccination did you receive? \" \"Is this your first or second shot? \" (e.g., none; Nielson Cuna, other)      N/a    3. SYMPTOM ONSET: \"When did the concern about side effects begin? \" (e.g., not relevant; hours, days)       N/a    4. SYMPTOM SEVERITY: \"How bad is it? \"       N/a    5. FEVER: \"Is there a fever? \" If so, ask: \"What is it, how was it measured, and when did it start? \"       No    6. PAST REACTIONS: \"Have you reacted to immunizations before? \" If so, ask: \"What happened? \"      No    7. OTHER SYMPTOMS: \"Do you have any other symptoms? \"      no    Protocols used: CORONAVIRUS (COVID-19) VACCINE QUESTIONS AND REACTIONS-ADULT-AH    Patient called [employer] with red flag complaint. Call received from [address] [place] [address] description of triage: Vaccine questions r/t obesity and sickle cell trait    Triage indicates for patient to keep appointment on 4/1/21    Care advice provided, patient verbalizes understanding; denies any other questions or concerns; instructed to call back for any new or worsening symptoms. Has pending appt already    Attention Provider: Thank you for allowing me to participate in the care of your patient. The patient was connected to triage in response to information from calling the [employer]. Please do not respond through this encounter as the response is not directed to a shared pool.

## 2021-04-01 ENCOUNTER — OFFICE VISIT (OUTPATIENT)
Dept: INTERNAL MEDICINE CLINIC | Age: 61
End: 2021-04-01
Payer: COMMERCIAL

## 2021-04-01 VITALS
RESPIRATION RATE: 20 BRPM | TEMPERATURE: 97.8 F | DIASTOLIC BLOOD PRESSURE: 79 MMHG | SYSTOLIC BLOOD PRESSURE: 125 MMHG | BODY MASS INDEX: 40.07 KG/M2 | OXYGEN SATURATION: 96 % | HEIGHT: 67 IN | HEART RATE: 81 BPM | WEIGHT: 255.3 LBS

## 2021-04-01 DIAGNOSIS — K26.9 DUODENAL ULCER: ICD-10-CM

## 2021-04-01 DIAGNOSIS — G89.29 CHRONIC MIDLINE LOW BACK PAIN WITHOUT SCIATICA: ICD-10-CM

## 2021-04-01 DIAGNOSIS — M54.50 CHRONIC MIDLINE LOW BACK PAIN WITHOUT SCIATICA: ICD-10-CM

## 2021-04-01 DIAGNOSIS — E66.01 SEVERE OBESITY WITH BODY MASS INDEX (BMI) OF 35.0 TO 39.9 WITH SERIOUS COMORBIDITY (HCC): ICD-10-CM

## 2021-04-01 DIAGNOSIS — I87.2 VENOUS INSUFFICIENCY: ICD-10-CM

## 2021-04-01 DIAGNOSIS — I10 ESSENTIAL HYPERTENSION: ICD-10-CM

## 2021-04-01 DIAGNOSIS — K62.5 RECTAL BLEEDING: Primary | ICD-10-CM

## 2021-04-01 PROCEDURE — 99214 OFFICE O/P EST MOD 30 MIN: CPT | Performed by: INTERNAL MEDICINE

## 2021-04-01 RX ORDER — HYDROCORTISONE ACETATE 25 MG/1
25 SUPPOSITORY RECTAL EVERY 12 HOURS
Qty: 12 EACH | Refills: 11 | Status: SHIPPED | OUTPATIENT
Start: 2021-04-01

## 2021-04-01 RX ORDER — LANSOPRAZOLE 30 MG/1
30 CAPSULE, DELAYED RELEASE ORAL 2 TIMES DAILY
Qty: 90 CAP | Refills: 10 | Status: SHIPPED | OUTPATIENT
Start: 2021-04-01 | End: 2021-12-16

## 2021-04-01 NOTE — PROGRESS NOTES
1. Have you been to the ER, urgent care clinic since your last visit? Hospitalized since your last visit? No    2. Have you seen or consulted any other health care providers outside of the [address] [place] [address] since your last visit? Include any pap smears or colon screening.  No     Wants to discuss blood in stool, GI issue and leg cramp

## 2021-04-01 NOTE — PROGRESS NOTES
SPORTS MEDICINE AND PRIMARY CARE  Richard Dan MD, [address] [address]  Phone:  [phone]  Fax: [phone]       Chief Complaint   Patient presents with    Hypertension   . SUBJECTIVE:    Isaiah Weaver is a 61 y.o. female Patient returns today complaining of rectal bleeding with colonoscopy on 17 [pls verify date] remarkable for diverticulosis and   . Patient continues to have intermittent abdominal discomfort in the paraumbilical area aggravated by food. It comes and goes. She also has rectal bleeding that comes and goes. When she is down in a flexed position, she gets cramps, sometimes in her legs, sometimes in her stomach. Patient also complains of low back pain. She had an x-ray of the lumbar spine on 21 that revealed multilevel spondylosis with small anterior osteophytes present without significant disc space narrowing. Patient denies other specific complaints and is seen for evaluation. Current Outpatient Medications   Medication Sig Dispense Refill    witch hazel-glycerin (TUCKS) 12.5-50 % pad 1 Pad by PeriANAL route as needed for Pain. 100 Pad 11    hydrocortisone (Anusol-HC) 25 mg supp Insert 1 Suppository into rectum every twelve (12) hours. 12 Each 11    lansoprazole (PREVACID) 30 mg capsule Take 1 Cap by mouth two (2) times a day. 90 Cap 10    metoprolol succinate (TOPROL-XL) 25 mg XL tablet TAKE 1 TABLET BY MOUTH EVERY DAY 90 Tab 3    cetirizine (ZYRTEC) 10 mg tablet Take 1 Tab by mouth daily as needed for Allergies.  27 Tab 3     Past Medical History:   Diagnosis Date    Bereavement 14    78 yo mother  - aspiration    Chronically on opiate therapy     Duodenal ulcer 2017    md mojgan    H/O colonoscopy 11    transversve colon    Hypertension     Low back pain     Neuropathy     Obesity     Preventative health care     Rectal bleeding     Rhinorrhea     Rotator cuff arthropathy     S/P colonoscopy 2017    diverticulosis - md mojgan    Venous insufficiency      Past Surgical History:   Procedure Laterality Date    COLONOSCOPY N/A 3/28/2017    COLONOSCOPY  performed by Misael Flores MD at [place] [place]     Allergies   Allergen Reactions    Shrimp Itching     Throat itching         REVIEW OF SYSTEMS:  General: negative for - chills or fever  ENT: negative for - headaches, nasal congestion or tinnitus  Respiratory: negative for - cough, hemoptysis, shortness of breath or wheezing  Cardiovascular : negative for - chest pain, edema, palpitations or shortness of breath  Gastrointestinal: negative for - abdominal pain, blood in stools, heartburn or nausea/vomiting  Genito-Urinary: no dysuria, trouble voiding, or hematuria  Musculoskeletal: negative for - gait disturbance, joint pain, joint stiffness or joint swelling  Neurological: no TIA or stroke symptoms  Hematologic: no bruises, no bleeding, no swollen glands  Integument: no lumps, mole changes, nail changes or rash  Endocrine: no malaise/lethargy or unexpected weight changes      Social History     Socioeconomic History    Marital status: SINGLE     Spouse name: Not on file    Number of children: Not on file    Years of education: Not on file    Highest education level: Not on file   Tobacco Use    Smoking status: Never Smoker    Smokeless tobacco: Never Used   Substance and Sexual Activity    Alcohol use: Yes     Frequency: Monthly or less     Drinks per session: 1 or 2     Comment: daily    Drug use: No    Sexual activity: Yes     Partners: Male     Birth control/protection: None   Social History Narrative    Medical History: 2011 CT abdomen and pelvis 4.5 simple cyst right adnexa otherw ise negative    Surgical History: colonoscopy - md mojgan        Hospitalization/Major Diagnostic Procedure: Denies Past Hospitalization    Family History:  Mother:  79 aspiration Father: deceasedSister(s): aliveBrother(s): aliveSon(s): alive1 brother(s) , 6 sister(s) . 32 son  . Social History: Alcohol Use Patient uses alcohol, Drinks per occasion: 1, Drinks per w [place]: 0. Smoking Status Patient is a never smoker        . Marital Status: Single. Lives w ith: alone. Occupation/W ork: employed full time -[employer] . Education/School: elementary school 8th. Episcopalian: no.     Family History   Problem Relation Age of Onset    Diabetes Father     Hypertension Father     Stroke Father        OBJECTIVE:    Visit Vitals  /79   Pulse 81   Temp 97.8 °F (36.6 °C) (Oral)   Resp 20   Ht 5' 7\" (1.702 m)   Wt 255 lb 4.8 oz (115.8 kg)   SpO2 96%   BMI 39.99 kg/m²     CONSTITUTIONAL: well , well nourished, appears age appropriate  EYES: perrla, eom intact  ENMT:moist mucous membranes, pharynx clear  NECK: supple. Thyroid normal  RESPIRATORY: Chest: clear bilaterally   CARDIOVASCULAR: Heart: regular rate and rhythm  GASTROINTESTINAL: Abdomen: soft, bowel sounds active  HEMATOLOGIC: no pathological lymph nodes palpated  MUSCULOSKELETAL: Extremities: no edema, pulse 1+   INTEGUMENT: No unusual rashes or suspicious skin lesions noted. Nails appear normal.  NEUROLOGIC: non-focal exam   MENTAL STATUS: alert and oriented, appropriate affect           ASSESSMENT:  1. Rectal bleeding    2. Essential hypertension    3. Venous insufficiency    4. Duodenal ulcer    5. Severe obesity with body mass index (BMI) of 35.0 to 39.9 with serious comorbidity ([address]    6. Chronic midline low back pain without sciatica      Patient has rectal bleeding, most likely hemorrhoidal in origin. We offer referral to GI, she prefers not at this time unless it does not subside. We suggest Tucks, as well as Anusol HC suppositories. Blood pressure control is at goal.    She is currently taking Metoprolol 25 mg daily. Venous insufficiency is asymptomatic.     She has a history of duodenal ulcer and it may be that the ulcer is flaring, causing her to have the abdominal discomfort. We suggest she increase Prevacid to twice a day until stomach discomfort subsides. If it does not subside, then certainly she will see gastroenterologist to repeat upper endoscopy. Obesity remains an issue and we note she gained a little more weight and she attributes that to the beers she drinks. She has chronic pain in her back, for which we suggest Purvi technique exercises as insurance does not want to pay for exercises. She will be back to see me in four to six months, sooner if needed. I have discussed the diagnosis with the patient and the intended plan as seen in the  Orders. The patient understands and agees with the plan. The patient has   received an after visit summary and questions were answered concerning  future plans  Patient labs and/or xrays were reviewed  Past records were reviewed. PLAN:  .  Orders Placed This Encounter    WILL MAMMO BI SCREENING INCL CAD    URINALYSIS W/ RFLX MICROSCOPIC    CBC WITH AUTOMATED DIFF    METABOLIC PANEL, COMPREHENSIVE    LIPID PANEL    TSH 3RD GENERATION    HEMOGLOBIN A1C WITH EAG    witch hazel-glycerin (TUCKS) 12.5-50 % pad    hydrocortisone (Anusol-HC) 25 mg supp    lansoprazole (PREVACID) 30 mg capsule       Follow-up and Dispositions    · Return in about 6 months (around 10/1/2021). ATTENTION:   This medical record was transcribed using an electronic medical records system. Although proofread, it may and can contain electronic and spelling errors. Other human spelling and other errors may be present. Corrections may be executed at a later time. Please feel free to contact us for any clarifications as needed.

## 2021-08-02 ENCOUNTER — OFFICE VISIT (OUTPATIENT)
Dept: INTERNAL MEDICINE CLINIC | Age: 61
End: 2021-08-02
Payer: COMMERCIAL

## 2021-08-02 VITALS
RESPIRATION RATE: 20 BRPM | OXYGEN SATURATION: 97 % | TEMPERATURE: 98.2 F | BODY MASS INDEX: 39.94 KG/M2 | HEART RATE: 92 BPM | HEIGHT: 67 IN | DIASTOLIC BLOOD PRESSURE: 82 MMHG | WEIGHT: 254.5 LBS | SYSTOLIC BLOOD PRESSURE: 140 MMHG

## 2021-08-02 DIAGNOSIS — M47.816 SPONDYLOSIS OF LUMBAR SPINE: ICD-10-CM

## 2021-08-02 DIAGNOSIS — Z98.890 S/P COLONOSCOPY: ICD-10-CM

## 2021-08-02 DIAGNOSIS — K62.5 RECTAL BLEEDING: ICD-10-CM

## 2021-08-02 DIAGNOSIS — I10 ESSENTIAL HYPERTENSION: Primary | ICD-10-CM

## 2021-08-02 DIAGNOSIS — G62.9 NEUROPATHY: ICD-10-CM

## 2021-08-02 DIAGNOSIS — I87.2 VENOUS INSUFFICIENCY: ICD-10-CM

## 2021-08-02 DIAGNOSIS — E66.01 SEVERE OBESITY WITH BODY MASS INDEX (BMI) OF 35.0 TO 39.9 WITH SERIOUS COMORBIDITY (HCC): ICD-10-CM

## 2021-08-02 PROBLEM — M54.9 BACK PAIN: Status: ACTIVE | Noted: 2021-08-02

## 2021-08-02 PROCEDURE — 99215 OFFICE O/P EST HI 40 MIN: CPT | Performed by: INTERNAL MEDICINE

## 2021-08-02 RX ORDER — METOPROLOL SUCCINATE 50 MG/1
TABLET, EXTENDED RELEASE ORAL
Qty: 90 TABLET | Refills: 3 | Status: SHIPPED | OUTPATIENT
Start: 2021-08-02 | End: 2021-12-16 | Stop reason: SDUPTHER

## 2021-08-02 NOTE — PROGRESS NOTES
1. Have you been to the ER, urgent care clinic since your last visit? Hospitalized since your last visit? No    2. Have you seen or consulted any other health care providers outside of the [address] [place] [address] since your last visit? Include any pap smears or colon screening.  No    Wants to discuss abdominal issue, blood in stool and back pain

## 2021-08-02 NOTE — PATIENT INSTRUCTIONS
Low Back Arthritis: Exercises  Introduction  Here are some examples of typical rehabilitation exercises for your condition. Start each exercise slowly. Ease off the exercise if you start to have pain. Your doctor or physical therapist will tell you when you can start these exercises and which ones will work best for you. When you are not being active, find a comfortable position for rest. Some people are comfortable on the floor or a medium-firm bed with a small pillow under their head and another under their knees. Some people prefer to lie on their side with a pillow between their knees. Don't stay in one position for too long. Take short walks (10 to 20 minutes) every 2 to 3 hours. Avoid slopes, hills, and stairs until you feel better. Walk only distances you can manage without pain, especially leg pain. How to do the exercises  Pelvic tilt   1. Lie on your back with your knees bent. 2. \"Brace\" your stomach--tighten your muscles by pulling in and imagining your belly button moving toward your spine. 3. Press your lower back into the floor. You should feel your hips and pelvis rock back. 4. Hold for 6 seconds while breathing smoothly. 5. Relax and allow your pelvis and hips to rock forward. 6. Repeat 8 to 12 times. Back stretches   1. Get down on your hands and knees on the floor. 2. Relax your head and allow it to droop. Round your back up toward the ceiling until you feel a nice stretch in your upper, middle, and lower back. Hold this stretch for as long as it feels comfortable, or about 15 to 30 seconds. 3. Return to the starting position with a flat back while you are on your hands and knees. 4. Let your back sway by pressing your stomach toward the floor. Lift your buttocks toward the ceiling. 5. Hold this position for 15 to 30 seconds. 6. Repeat 2 to 4 times. Follow-up care is a key part of your treatment and safety.  Be sure to make and go to all appointments, and call your doctor if you are having problems. It's also a good idea to know your test results and keep a list of the medicines you take. Where can you learn more? Go to http://www.[employer].com/  Enter T094 in the search box to learn more about \"Low Back Arthritis: Exercises. \"  Current as of: November 16, 2020               Content Version: 12.8  © 6933-3817 [employer]. Care instructions adapted under license by [employer] (which disclaims liability or warranty for this information). If you have questions about a medical condition or this instruction, always ask your healthcare professional. [place] [address] any warranty or liability for your use of this information.

## 2021-08-02 NOTE — PROGRESS NOTES
SPORTS MEDICINE AND PRIMARY CARE  Levon Friedman MD, [address] [address]  Phone:  [phone]  Fax: [phone]       Chief Complaint   Patient presents with    Hypertension   . SUBJECTIVE:    Ivana Ramos is a 61 y.o. female Patient returns today with a known history of primary hypertension, chronic venous insufficiency, colonoscopy in 2017 with findings of diverticulosis, rectal bleeding, obesity, and is seen for evaluation. Patient returns today with blood mixed with her stools. She also complains of low back pain. We recall that she had x-rays of her back that revealed multilevel spondylosis. She is seen for evaluation. Current Outpatient Medications   Medication Sig Dispense Refill    metoprolol succinate (TOPROL-XL) 50 mg XL tablet TAKE 1 TABLET BY MOUTH EVERY DAY 90 Tablet 3    witch hazel-glycerin (TUCKS) 12.5-50 % pad 1 Pad by PeriANAL route as needed for Pain. 100 Pad 11    hydrocortisone (Anusol-HC) 25 mg supp Insert 1 Suppository into rectum every twelve (12) hours. 12 Each 11    lansoprazole (PREVACID) 30 mg capsule Take 1 Cap by mouth two (2) times a day. 90 Cap 10    cetirizine (ZYRTEC) 10 mg tablet Take 1 Tab by mouth daily as needed for Allergies.  30 Tab 3     Past Medical History:   Diagnosis Date    Back pain 2021    Bereavement 14    80 yo mother  - aspiration    Chronically on opiate therapy     Duodenal ulcer 2017    md mojgan    H/O colonoscopy 11    transversve colon    Hypertension     Low back pain     Neuropathy     Obesity     Preventative health care     Rectal bleeding     Rhinorrhea     Rotator cuff arthropathy     S/P colonoscopy 2017    diverticulosis - md mojgan    Spondylosis of lumbar spine 2018    Venous insufficiency      Past Surgical History:   Procedure Laterality Date    COLONOSCOPY N/A 3/28/2017    COLONOSCOPY  performed by Brittany Durham MD at [facility] MAIN OR     Allergies   Allergen Reactions    Shrimp Itching     Throat itching         REVIEW OF SYSTEMS:  General: negative for - chills or fever  ENT: negative for - headaches, nasal congestion or tinnitus  Respiratory: negative for - cough, hemoptysis, shortness of breath or wheezing  Cardiovascular : negative for - chest pain, edema, palpitations or shortness of breath  Gastrointestinal: negative for - abdominal pain, blood in stools, heartburn or nausea/vomiting  Genito-Urinary: no dysuria, trouble voiding, or hematuria  Musculoskeletal: negative for - gait disturbance, joint pain, joint stiffness or joint swelling  Neurological: no TIA or stroke symptoms  Hematologic: no bruises, no bleeding, no swollen glands  Integument: no lumps, mole changes, nail changes or rash  Endocrine: no malaise/lethargy or unexpected weight changes      Social History     Socioeconomic History    Marital status: SINGLE     Spouse name: Not on file    Number of children: Not on file    Years of education: Not on file    Highest education level: Not on file   Tobacco Use    Smoking status: Never Smoker    Smokeless tobacco: Never Used   Vaping Use    Vaping Use: Never used   Substance and Sexual Activity    Alcohol use: Yes     Comment: daily    Drug use: No    Sexual activity: Yes     Partners: Male     Birth control/protection: None   Social History Narrative    Medical History: 2011 CT abdomen and pelvis 4.5 simple cyst right adnexa otherw ise negative    Surgical History: colonoscopy - md mojgan        Hospitalization/Major Diagnostic Procedure: Denies Past Hospitalization    Family History: Mother:  79 aspiration Father: deceasedSister(s): aliveBrother(s): aliveSon(s): alive1 brother(s) , 6 sister(s) . 32 son  . Social History: Alcohol Use Patient uses alcohol, Drinks per occasion: 1, Drinks per w [place]: 0. Smoking Status Patient is a never smoker        . Marital Status: Single. Lives w ith: alone. Occupation/W ork: employed full time -[employer] . Education/School: elementary school 8th. Presybeterian: no.     Social Determinants of Health     Financial Resource Strain:     Difficulty of Paying Living Expenses:    Food Insecurity:     Worried About Running Out of Food in the Last Year:     [address] in the Last Year:    Transportation Needs:     Lack of Transportation (Medical):  Lack of Transportation (Non-Medical):    Physical Activity:     Days of Exercise per Week:     Minutes of Exercise per Session:    Stress:     Feeling of Stress :    Social Connections:     Frequency of Communication with Friends and Family:     Frequency of Social Gatherings with Friends and Family:     Attends Episcopal Services:     Active Member of Clubs or Organizations:     Attends Club or Organization Meetings:     Marital Status:      Family History   Problem Relation Age of Onset    Diabetes Father     Hypertension Father     Stroke Father        OBJECTIVE:    Visit Vitals  BP (!) 144/86   Pulse 92   Temp 98.2 °F (36.8 °C) (Oral)   Resp 20   Ht 5' 7\" (1.702 m)   Wt 254 lb 8 oz (115.4 kg)   SpO2 97%   BMI 39.86 kg/m²     CONSTITUTIONAL: well , well nourished, appears age appropriate  EYES: perrla, eom intact  ENMT:moist mucous membranes, pharynx clear  NECK: supple. Thyroid normal  RESPIRATORY: Chest: clear bilaterally   CARDIOVASCULAR: Heart: regular rate and rhythm  GASTROINTESTINAL: Abdomen: soft, bowel sounds active  HEMATOLOGIC: no pathological lymph nodes palpated  MUSCULOSKELETAL: Extremities: no edema, pulse 1+   INTEGUMENT: No unusual rashes or suspicious skin lesions noted. Nails appear normal.  NEUROLOGIC: non-focal exam   MENTAL STATUS: alert and oriented, appropriate affect           ASSESSMENT:  1. Essential hypertension    2. S/P colonoscopy    3. Rectal bleeding    4. Venous insufficiency    5.  Severe obesity with body mass index (BMI) of 35.0 to 39.9 with serious comorbidity ([facility] [address]    6. Neuropathy    7. Spondylosis of lumbar spine      Blood pressure is improved compared to when she first came in, it is down to 140/82. She is on Metoprolol 25 mg daily. She complained of rectal bleeding. She has had this in the past.  We placed her on suppositories and asked her to start using Tucks, but in spite of that she has blood mixed with the stools. Therefore, we refer her back to GI for further evaluation. Chronic venous insufficiency is aggravated by prolonged standing in the place that she works. I think her obesity is starting to catch up with her because of back pain she complains of and we encourage a heart healthy, weight reducing diet and physical activity 30 minutes five days a week. She complains of low back pain, which I think is related to musculoskeletal pain, as well as spondylosis. I think the musculoskeletal aspect of the back pain is aggravated by her morbid obesity. In the interim, however, we will give her exercises to do, as well as refer her to physical therapy if her insurance will pay for it. It may be that she will have to call her insurance company to find out which ones they will pay for. She will be back to see me in four to six months, sooner if needed. I have discussed the diagnosis with the patient and the intended plan as seen in the  Orders. The patient understands and agees with the plan. The patient has   received an after visit summary and questions were answered concerning  future plans  Patient labs and/or xrays were reviewed  Past records were reviewed.     PLAN:  .  Orders Placed This Encounter    WILL MAMMO BI SCREENING INCL CAD    URINALYSIS W/ RFLX MICROSCOPIC    CBC WITH AUTOMATED DIFF    METABOLIC PANEL, COMPREHENSIVE    LIPID PANEL    TSH 3RD GENERATION    HEMOGLOBIN A1C WITH EAG    REFERRAL TO GASTROENTEROLOGY    REFERRAL TO PHYSICAL THERAPY    metoprolol succinate (TOPROL-XL) 50 mg XL tablet Follow-up and Dispositions    · Return in about 4 months (around 12/2/2021). ATTENTION:   This medical record was transcribed using an electronic medical records system. Although proofread, it may and can contain electronic and spelling errors. Other human spelling and other errors may be present. Corrections may be executed at a later time. Please feel free to contact us for any clarifications as needed.

## 2021-08-03 LAB
ALBUMIN SERPL-MCNC: 4.2 G/DL (ref 3.5–5)
ALBUMIN/GLOB SERPL: 0.9 {RATIO} (ref 1.1–2.2)
ALP SERPL-CCNC: 81 U/L (ref 45–117)
ALT SERPL-CCNC: 27 U/L (ref 12–78)
ANION GAP SERPL CALC-SCNC: 6 MMOL/L (ref 5–15)
APPEARANCE UR: ABNORMAL
AST SERPL-CCNC: 23 U/L (ref 15–37)
BACTERIA URNS QL MICRO: ABNORMAL /HPF
BASOPHILS # BLD: 0.1 K/UL (ref 0–0.1)
BASOPHILS NFR BLD: 1 % (ref 0–1)
BILIRUB SERPL-MCNC: 0.3 MG/DL (ref 0.2–1)
BILIRUB UR QL: NEGATIVE
BUN SERPL-MCNC: 14 MG/DL (ref 6–20)
BUN/CREAT SERPL: 15 (ref 12–20)
CALCIUM SERPL-MCNC: 9.5 MG/DL (ref 8.5–10.1)
CHLORIDE SERPL-SCNC: 105 MMOL/L (ref 97–108)
CHOLEST SERPL-MCNC: 217 MG/DL
CO2 SERPL-SCNC: 29 MMOL/L (ref 21–32)
COLOR UR: ABNORMAL
CREAT SERPL-MCNC: 0.93 MG/DL (ref 0.55–1.02)
DIFFERENTIAL METHOD BLD: ABNORMAL
EOSINOPHIL # BLD: 0.3 K/UL (ref 0–0.4)
EOSINOPHIL NFR BLD: 5 % (ref 0–7)
EPITH CASTS URNS QL MICRO: ABNORMAL /LPF
ERYTHROCYTE [DISTWIDTH] IN BLOOD BY AUTOMATED COUNT: 15.5 % (ref 11.5–14.5)
EST. AVERAGE GLUCOSE BLD GHB EST-MCNC: 126 MG/DL
GLOBULIN SER CALC-MCNC: 4.9 G/DL (ref 2–4)
GLUCOSE SERPL-MCNC: 94 MG/DL (ref 65–100)
GLUCOSE UR STRIP.AUTO-MCNC: NEGATIVE MG/DL
HBA1C MFR BLD: 6 % (ref 4–5.6)
HCT VFR BLD AUTO: 40 % (ref 35–47)
HDLC SERPL-MCNC: 57 MG/DL
HDLC SERPL: 3.8 {RATIO} (ref 0–5)
HGB BLD-MCNC: 12.7 G/DL (ref 11.5–16)
HGB UR QL STRIP: ABNORMAL
HYALINE CASTS URNS QL MICRO: ABNORMAL /LPF (ref 0–5)
IMM GRANULOCYTES # BLD AUTO: 0 K/UL (ref 0–0.04)
IMM GRANULOCYTES NFR BLD AUTO: 1 % (ref 0–0.5)
KETONES UR QL STRIP.AUTO: NEGATIVE MG/DL
LDLC SERPL CALC-MCNC: 112 MG/DL (ref 0–100)
LEUKOCYTE ESTERASE UR QL STRIP.AUTO: ABNORMAL
LYMPHOCYTES # BLD: 2.1 K/UL (ref 0.8–3.5)
LYMPHOCYTES NFR BLD: 32 % (ref 12–49)
MCH RBC QN AUTO: 28.3 PG (ref 26–34)
MCHC RBC AUTO-ENTMCNC: 31.8 G/DL (ref 30–36.5)
MCV RBC AUTO: 89.1 FL (ref 80–99)
MONOCYTES # BLD: 0.8 K/UL (ref 0–1)
MONOCYTES NFR BLD: 13 % (ref 5–13)
NEUTS SEG # BLD: 3.2 K/UL (ref 1.8–8)
NEUTS SEG NFR BLD: 48 % (ref 32–75)
NITRITE UR QL STRIP.AUTO: POSITIVE
NRBC # BLD: 0 K/UL (ref 0–0.01)
NRBC BLD-RTO: 0 PER 100 WBC
PH UR STRIP: 5 [PH] (ref 5–8)
PLATELET # BLD AUTO: 245 K/UL (ref 150–400)
PMV BLD AUTO: 12.4 FL (ref 8.9–12.9)
POTASSIUM SERPL-SCNC: 5.2 MMOL/L (ref 3.5–5.1)
PROT SERPL-MCNC: 9.1 G/DL (ref 6.4–8.2)
PROT UR STRIP-MCNC: NEGATIVE MG/DL
RBC # BLD AUTO: 4.49 M/UL (ref 3.8–5.2)
RBC #/AREA URNS HPF: ABNORMAL /HPF (ref 0–5)
SODIUM SERPL-SCNC: 140 MMOL/L (ref 136–145)
SP GR UR REFRACTOMETRY: 1.01 (ref 1–1.03)
TRIGL SERPL-MCNC: 240 MG/DL (ref ?–150)
TSH SERPL DL<=0.05 MIU/L-ACNC: 0.91 UIU/ML (ref 0.36–3.74)
UROBILINOGEN UR QL STRIP.AUTO: 0.2 EU/DL (ref 0.2–1)
VLDLC SERPL CALC-MCNC: 48 MG/DL
WBC # BLD AUTO: 6.6 K/UL (ref 3.6–11)
WBC URNS QL MICRO: ABNORMAL /HPF (ref 0–4)

## 2021-08-03 RX ORDER — ROSUVASTATIN CALCIUM 5 MG/1
5 TABLET, COATED ORAL
Qty: 30 TABLET | Refills: 5 | Status: SHIPPED | OUTPATIENT
Start: 2021-08-03 | End: 2022-03-10

## 2021-08-03 NOTE — PROGRESS NOTES
Your hemoglobin A1c continues to have evidence of prediabetes. Your thyroid studies and blood count are normal.  Your cholesterol is elevated and your current 10-year ASCVD risk is 6.8% which is borderline. You have the option of continuing to control it with the diet or begin a statin or cholesterol medication to lower your cholesterol. If you decide to begin medications I have sent to the pharmacy. If you decide not and you continue with the diet. I am concerned about your elevated protein we'll ask you to come to the office for repeat laboratory studies to determine its origin. You can call for nurse appointment.   She will draw a gammopathy profile

## 2021-08-12 ENCOUNTER — TELEPHONE (OUTPATIENT)
Dept: INTERNAL MEDICINE CLINIC | Age: 61
End: 2021-08-12

## 2021-08-20 ENCOUNTER — CLINICAL SUPPORT (OUTPATIENT)
Dept: INTERNAL MEDICINE CLINIC | Age: 61
End: 2021-08-20

## 2021-08-20 DIAGNOSIS — D89.2 PARAPROTEINEMIA: Primary | ICD-10-CM

## 2021-08-25 LAB
ALBUMIN SERPL ELPH-MCNC: 3.9 G/DL (ref 2.9–4.4)
ALBUMIN/GLOB SERPL: 1.1 {RATIO} (ref 0.7–1.7)
ALPHA1 GLOB SERPL ELPH-MCNC: 0.2 G/DL (ref 0–0.4)
ALPHA2 GLOB SERPL ELPH-MCNC: 0.6 G/DL (ref 0.4–1)
B-GLOBULIN SERPL ELPH-MCNC: 1.3 G/DL (ref 0.7–1.3)
GAMMA GLOB SERPL ELPH-MCNC: 1.8 G/DL (ref 0.4–1.8)
GLOBULIN SER-MCNC: 3.9 G/DL (ref 2.2–3.9)
IGA SERPL-MCNC: 456 MG/DL (ref 87–352)
IGG SERPL-MCNC: 1841 MG/DL (ref 586–1602)
IGM SERPL-MCNC: 54 MG/DL (ref 26–217)
INTERPRETATION SERPL IEP-IMP: ABNORMAL
KAPPA LC FREE SER-MCNC: 34.5 MG/L (ref 3.3–19.4)
KAPPA LC FREE/LAMBDA FREE SER: 2.07 {RATIO} (ref 0.26–1.65)
LAMBDA LC FREE SERPL-MCNC: 16.7 MG/L (ref 5.7–26.3)
M PROTEIN SERPL ELPH-MCNC: ABNORMAL G/DL
PLEASE NOTE:, 149534: ABNORMAL
PROT SERPL-MCNC: 7.8 G/DL (ref 6–8.5)

## 2021-10-04 RX ORDER — METOPROLOL SUCCINATE 25 MG/1
TABLET, EXTENDED RELEASE ORAL
Qty: 90 TABLET | Refills: 3 | Status: SHIPPED | OUTPATIENT
Start: 2021-10-04 | End: 2021-12-16

## 2021-12-16 ENCOUNTER — OFFICE VISIT (OUTPATIENT)
Dept: INTERNAL MEDICINE CLINIC | Age: 61
End: 2021-12-16
Payer: COMMERCIAL

## 2021-12-16 VITALS
TEMPERATURE: 97.9 F | WEIGHT: 252.7 LBS | OXYGEN SATURATION: 96 % | BODY MASS INDEX: 39.66 KG/M2 | DIASTOLIC BLOOD PRESSURE: 80 MMHG | HEIGHT: 67 IN | HEART RATE: 73 BPM | SYSTOLIC BLOOD PRESSURE: 124 MMHG | RESPIRATION RATE: 18 BRPM

## 2021-12-16 DIAGNOSIS — K62.5 RECTAL BLEEDING: ICD-10-CM

## 2021-12-16 DIAGNOSIS — G89.29 CHRONIC MIDLINE LOW BACK PAIN WITHOUT SCIATICA: ICD-10-CM

## 2021-12-16 DIAGNOSIS — G62.9 NEUROPATHY: ICD-10-CM

## 2021-12-16 DIAGNOSIS — M12.812 ROTATOR CUFF ARTHROPATHY OF LEFT SHOULDER: ICD-10-CM

## 2021-12-16 DIAGNOSIS — E66.01 SEVERE OBESITY WITH BODY MASS INDEX (BMI) OF 35.0 TO 39.9 WITH SERIOUS COMORBIDITY (HCC): ICD-10-CM

## 2021-12-16 DIAGNOSIS — I87.2 VENOUS INSUFFICIENCY: ICD-10-CM

## 2021-12-16 DIAGNOSIS — I10 PRIMARY HYPERTENSION: Primary | ICD-10-CM

## 2021-12-16 DIAGNOSIS — M54.50 CHRONIC MIDLINE LOW BACK PAIN WITHOUT SCIATICA: ICD-10-CM

## 2021-12-16 DIAGNOSIS — M47.816 SPONDYLOSIS OF LUMBAR SPINE: ICD-10-CM

## 2021-12-16 PROCEDURE — 99214 OFFICE O/P EST MOD 30 MIN: CPT | Performed by: INTERNAL MEDICINE

## 2021-12-16 RX ORDER — PANTOPRAZOLE SODIUM 40 MG/1
40 TABLET, DELAYED RELEASE ORAL DAILY
Qty: 90 TABLET | Refills: 3 | Status: SHIPPED | OUTPATIENT
Start: 2021-12-16 | End: 2022-02-27

## 2021-12-16 RX ORDER — METOPROLOL SUCCINATE 50 MG/1
TABLET, EXTENDED RELEASE ORAL
Qty: 90 TABLET | Refills: 3 | Status: SHIPPED | OUTPATIENT
Start: 2021-12-16

## 2021-12-16 NOTE — PROGRESS NOTES
SPORTS MEDICINE AND PRIMARY CARE  Mel Turcios MD, [address] [address]  Phone:  [phone]  Fax: [phone]       Chief Complaint   Patient presents with    Hypertension   . SUBJECTIVE:    Gurdeep Dean is a 64 y.o. female Patient returns today with a known history of primary hypertension, chronic venous insufficiency, rectal bleeding, rotator cuff arthropathy, low back pain, obesity, spondylosis of lumbar spine, neuropathy, and is seen for evaluation. She voices no new complaints except there was an error made with the Metoprolol. She is taking 50 and they gave her 22. Patient is seen for evaluation. Current Outpatient Medications   Medication Sig Dispense Refill    metoprolol succinate (TOPROL-XL) 50 mg XL tablet TAKE 1 TABLET BY MOUTH EVERY DAY 90 Tablet 3    pantoprazole (PROTONIX) 40 mg tablet Take 1 Tablet by mouth daily. 90 Tablet 3    rosuvastatin (CRESTOR) 5 mg tablet Take 1 Tablet by mouth nightly. 30 Tablet 5    witch hazel-glycerin (TUCKS) 12.5-50 % pad 1 Pad by PeriANAL route as needed for Pain. 100 Pad 11    hydrocortisone (Anusol-HC) 25 mg supp Insert 1 Suppository into rectum every twelve (12) hours. 12 Each 11    cetirizine (ZYRTEC) 10 mg tablet Take 1 Tab by mouth daily as needed for Allergies.  30 Tab 3     Past Medical History:   Diagnosis Date    Back pain 2021    Bereavement 14    80 yo mother  - aspiration    Chronically on opiate therapy     Duodenal ulcer 2017    md mojgan    H/O colonoscopy 11    transversve colon    Hypertension     Low back pain     Neuropathy     Obesity     Preventative health care     Rectal bleeding     Rhinorrhea     Rotator cuff arthropathy     S/P colonoscopy 2017    diverticulosis - md mojgan    Spondylosis of lumbar spine 2018    Venous insufficiency        Past Surgical History:   Procedure Laterality Date    COLONOSCOPY N/A 3/28/2017 COLONOSCOPY  performed by Isabel Kuhn MD at [facility]     Allergies   Allergen Reactions    Shrimp Itching     Throat itching         REVIEW OF SYSTEMS:  General: negative for - chills or fever  ENT: negative for - headaches, nasal congestion or tinnitus  Respiratory: negative for - cough, hemoptysis, shortness of breath or wheezing  Cardiovascular : negative for - chest pain, edema, palpitations or shortness of breath  Gastrointestinal: negative for - abdominal pain, blood in stools, heartburn or nausea/vomiting  Genito-Urinary: no dysuria, trouble voiding, or hematuria  Musculoskeletal: negative for - gait disturbance, joint pain, joint stiffness or joint swelling  Neurological: no TIA or stroke symptoms  Hematologic: no bruises, no bleeding, no swollen glands  Integument: no lumps, mole changes, nail changes or rash  Endocrine: no malaise/lethargy or unexpected weight changes      Social History     Socioeconomic History    Marital status: SINGLE   Tobacco Use    Smoking status: Never Smoker    Smokeless tobacco: Never Used   Vaping Use    Vaping Use: Never used   Substance and Sexual Activity    Alcohol use: Yes     Comment: daily    Drug use: No    Sexual activity: Yes     Partners: Male     Birth control/protection: None   Social History Narrative    Medical History: 2011 CT abdomen and pelvis 4.5 simple cyst right adnexa otherw ise negative    Surgical History: colonoscopy - md mojgan        Hospitalization/Major Diagnostic Procedure: Denies Past Hospitalization    Family History: Mother:  79 aspiration Father: deceasedSister(s): aliveBrother(s): aliveSon(s): alive1 brother(s) , 6 sister(s) . 32 son  . Social History: Alcohol Use Patient uses alcohol, Drinks per occasion: 1, Drinks per w [place]: 0. Smoking Status Patient is a never smoker        . Marital Status: Single. Lives w ith: alone. Occupation/W ork: employed full time -[employer] . Education/School: elementary school 8th. Congregation: no.     Family History   Problem Relation Age of Onset    Diabetes Father     Hypertension Father     Stroke Father        OBJECTIVE:    Visit Vitals  /80   Pulse 73   Temp 97.9 °F (36.6 °C) (Oral)   Resp 18   Ht 5' 7\" (1.702 m)   Wt 252 lb 11.2 oz (114.6 kg)   SpO2 96%   BMI 39.58 kg/m²     CONSTITUTIONAL: well , well nourished, appears age appropriate  EYES: perrla, eom intact  ENMT:moist mucous membranes, pharynx clear  NECK: supple. Thyroid normal  RESPIRATORY: Chest: clear bilaterally   CARDIOVASCULAR: Heart: regular rate and rhythm  GASTROINTESTINAL: Abdomen: soft, bowel sounds active  HEMATOLOGIC: no pathological lymph nodes palpated  MUSCULOSKELETAL: Extremities: no edema, pulse 1+   INTEGUMENT: No unusual rashes or suspicious skin lesions noted. Nails appear normal.  NEUROLOGIC: non-focal exam   MENTAL STATUS: alert and oriented, appropriate affect           ASSESSMENT:  1. Primary hypertension    2. Venous insufficiency    3. Rectal bleeding    4. Rotator cuff arthropathy of left shoulder    5. Chronic midline low back pain without sciatica    6. Severe obesity with body mass index (BMI) of 35.0 to 39.9 with serious comorbidity (HCC)    7. Spondylosis of lumbar spine    8. Neuropathy      Blood pressure control is adequate on the current regimen, that is Metoprolol 50 mg. Chronic venous insufficiency is not particularly symptomatic currently. No further rectal bleeding. Rotator cuff arthropathy of the left shoulder is quiescent. She has chronic low back pain related to spondylolisthesis of the lumbar spine, but currently not a complaint today. We encourage a heart healthy, weight reducing diet after the holidays and she agrees. No neuropathic symptoms. She will be back to see me in four to six months, sooner if she has any problems.       I have discussed the diagnosis with the patient and the intended plan as seen in the  Orders. The patient understands and agees with the plan. The patient has   received an after visit summary and questions were answered concerning  future plans  Patient labs and/or xrays were reviewed  Past records were reviewed. PLAN:  .  Orders Placed This Encounter    metoprolol succinate (TOPROL-XL) 50 mg XL tablet    pantoprazole (PROTONIX) 40 mg tablet       Follow-up and Dispositions    · Return in about 6 months (around 6/16/2022). ATTENTION:   This medical record was transcribed using an electronic medical records system. Although proofread, it may and can contain electronic and spelling errors. Other human spelling and other errors may be present. Corrections may be executed at a later time. Please feel free to contact us for any clarifications as needed.

## 2021-12-16 NOTE — PROGRESS NOTES
1. Have you been to the ER, urgent care clinic since your last visit? Hospitalized since your last visit? No    2. Have you seen or consulted any other health care providers outside of the [address] [place] [address] since your last visit? Include any pap smears or colon screening.  No     Wants to discuss metoprolol dosage

## 2022-02-27 RX ORDER — LANSOPRAZOLE 30 MG/1
CAPSULE, DELAYED RELEASE ORAL
Qty: 90 CAPSULE | Refills: 10 | Status: SHIPPED | OUTPATIENT
Start: 2022-02-27

## 2022-03-18 PROBLEM — Z98.890 S/P COLONOSCOPY: Status: ACTIVE | Noted: 2017-03-28

## 2022-03-18 PROBLEM — M54.9 BACK PAIN: Status: ACTIVE | Noted: 2021-08-02

## 2022-03-19 PROBLEM — K26.9 DUODENAL ULCER: Status: ACTIVE | Noted: 2017-03-28

## 2022-03-20 PROBLEM — E66.01 SEVERE OBESITY WITH BODY MASS INDEX (BMI) OF 35.0 TO 39.9 WITH SERIOUS COMORBIDITY (HCC): Status: ACTIVE | Noted: 2018-08-15

## 2022-03-20 PROBLEM — M47.816 SPONDYLOSIS OF LUMBAR SPINE: Status: ACTIVE | Noted: 2018-02-21

## 2022-08-02 ENCOUNTER — OFFICE VISIT (OUTPATIENT)
Dept: INTERNAL MEDICINE CLINIC | Age: 62
End: 2022-08-02
Payer: COMMERCIAL

## 2022-08-02 VITALS
HEART RATE: 77 BPM | BODY MASS INDEX: 40.16 KG/M2 | TEMPERATURE: 97.5 F | WEIGHT: 255.9 LBS | HEIGHT: 67 IN | RESPIRATION RATE: 20 BRPM | DIASTOLIC BLOOD PRESSURE: 78 MMHG | SYSTOLIC BLOOD PRESSURE: 140 MMHG | OXYGEN SATURATION: 96 %

## 2022-08-02 DIAGNOSIS — K62.5 RECTAL BLEEDING: ICD-10-CM

## 2022-08-02 DIAGNOSIS — I10 PRIMARY HYPERTENSION: Primary | ICD-10-CM

## 2022-08-02 DIAGNOSIS — M54.50 CHRONIC BILATERAL LOW BACK PAIN WITHOUT SCIATICA: ICD-10-CM

## 2022-08-02 DIAGNOSIS — M54.50 CHRONIC MIDLINE LOW BACK PAIN WITHOUT SCIATICA: ICD-10-CM

## 2022-08-02 DIAGNOSIS — Z01.419 WOMEN'S ANNUAL ROUTINE GYNECOLOGICAL EXAMINATION: ICD-10-CM

## 2022-08-02 DIAGNOSIS — I87.2 VENOUS INSUFFICIENCY: ICD-10-CM

## 2022-08-02 DIAGNOSIS — G89.29 CHRONIC BILATERAL LOW BACK PAIN WITHOUT SCIATICA: ICD-10-CM

## 2022-08-02 DIAGNOSIS — E66.01 MORBID OBESITY WITH BMI OF 40.0-44.9, ADULT (HCC): ICD-10-CM

## 2022-08-02 DIAGNOSIS — G89.29 CHRONIC MIDLINE LOW BACK PAIN WITHOUT SCIATICA: ICD-10-CM

## 2022-08-02 DIAGNOSIS — R73.02 IGT (IMPAIRED GLUCOSE TOLERANCE): ICD-10-CM

## 2022-08-02 PROCEDURE — 99214 OFFICE O/P EST MOD 30 MIN: CPT | Performed by: INTERNAL MEDICINE

## 2022-08-02 NOTE — PROGRESS NOTES
Phyllis Blake is a 64 y.o. female  HIPAA verified by two patient identifiers. Health Maintenance Due   Topic    Cervical cancer screen     Shingrix Vaccine Age 50> (1 of 2)    Breast Cancer Screen Mammogram     A1C test (Diabetic or Prediabetic)     Lipid Screen      Chief Complaint   Patient presents with    Hypertension     Follow up       Visit Vitals  BP (!) 158/67   Pulse 77   Temp 97.5 °F (36.4 °C) (Oral)   Resp 20   Ht 5' 7\" (1.702 m)   Wt 255 lb 14.4 oz (116.1 kg)   SpO2 96%   BMI 40.08 kg/m²       Pain Scale: 5/10  Pain Location: Foot (bilateral)  1. Have you been to the ER, urgent care clinic since your last visit? Hospitalized since your last visit? No    2. Have you seen or consulted any other health care providers outside of the [address] [place] [address] since your last visit? Include any pap smears or colon screening. No    1. Have you been to the ER, urgent care clinic since your last visit? Hospitalized since your last visit? No    2. Have you seen or consulted any other health care providers outside of the [address] [place] [address] since your last visit? Include any pap smears or colon screening.  No

## 2022-08-02 NOTE — PROGRESS NOTES
SPORTS MEDICINE AND PRIMARY CARE  Ulices Rodriguez MD, [address] [address]  Phone:  [phone]  Fax: [phone]       Chief Complaint   Patient presents with    Hypertension     Follow up     . SUBJECTIVE:    Martha Pemberton is a 64 y.o. female Patient returns today with a known history of hypertension, venous insufficiency, low back pain, morbid obesity and is seen for evaluation. Patient returns today voicing no new complaints. She is not as active as she used to be with her new position at work. She is not up and is sitting most of the day. When she comes home she does not feel like walking. Patient is seen for evaluation. Current Outpatient Medications   Medication Sig Dispense Refill    rosuvastatin (CRESTOR) 5 mg tablet TAKE 1 TABLET BY MOUTH EVERY DAY AT NIGHT 90 Tablet 3    lansoprazole (PREVACID) 30 mg capsule TAKE 1 CAPSULE BY MOUTH EVERY DAY BEFORE BREAKFAST 90 Capsule 10    metoprolol succinate (TOPROL-XL) 50 mg XL tablet TAKE 1 TABLET BY MOUTH EVERY DAY 90 Tablet 3    witch hazel-glycerin (TUCKS) 12.5-50 % pad 1 Pad by PeriANAL route as needed for Pain. 100 Pad 11    hydrocortisone (Anusol-HC) 25 mg supp Insert 1 Suppository into rectum every twelve (12) hours. 12 Each 11    cetirizine (ZYRTEC) 10 mg tablet Take 1 Tab by mouth daily as needed for Allergies.  (Patient not taking: Reported on 2022) 30 Tab 3     Past Medical History:   Diagnosis Date    Back pain 2021    Bereavement 14    80 yo mother  - aspiration    Chronically on opiate therapy     Duodenal ulcer 2017    md mojgan    H/O colonoscopy 11    transversve colon    Hypertension     Low back pain     Neuropathy     Obesity     Preventative health care     Rectal bleeding     Rhinorrhea     Rotator cuff arthropathy     S/P colonoscopy 2017    diverticulosis - md mojgan    Spondylosis of lumbar spine 2018    Venous insufficiency      Past Surgical History:   Procedure Laterality Date    COLONOSCOPY N/A 3/28/2017    COLONOSCOPY  performed by [facility] MD at [facility] MA[facility]     Allergies   Allergen Reactions    Shrimp Itching     Throat itching         REVIEW OF SYSTEMS:  General: negative for - chills or fever  ENT: negative for - headaches, nasal congestion or tinnitus  Respiratory: negative for - cough, hemoptysis, shortness of breath or wheezing  Cardiovascular : negative for - chest pain, edema, palpitations or shortness of breath  Gastrointestinal: negative for - abdominal pain, blood in stools, heartburn or nausea/vomiting  Genito-Urinary: no dysuria, trouble voiding, or hematuria  Musculoskeletal: negative for - gait disturbance, joint pain, joint stiffness or joint swelling  Neurological: no TIA or stroke symptoms  Hematologic: no bruises, no bleeding, no swollen glands  Integument: no lumps, mole changes, nail changes or rash  Endocrine: no malaise/lethargy or unexpected weight changes      Social History     Socioeconomic History    Marital status: SINGLE   Tobacco Use    Smoking status: Never    Smokeless tobacco: Never   Vaping Use    Vaping Use: Never used   Substance and Sexual Activity    Alcohol use: Yes     Comment: daily    Drug use: No    Sexual activity: Yes     Partners: Male     Birth control/protection: None   Social History Narrative    Medical History: 2011 CT abdomen and pelvis 4.5 simple cyst right adnexa otherw ise negative    Surgical History: colonoscopy - md mojgan        Hospitalization/Major Diagnostic Procedure: Denies Past Hospitalization    Family History: Mother:  79 aspiration Father: deceasedSister(s): aliveBrother(s): aliveSon(s): alive1 brother(s) , 6 sister(s) . 32 son  . Social History: Alcohol Use Patient uses alcohol, Drinks per occasion: 2 - 40's beer daily, Drinks per w [place]:  Smoking Status Patient is a never smoker        . Marital Status: Single. Lives w ith: alone. Occupation/W ork: employed full time -[employer] . Education/School: elementary school 8th. Yazdanism: no.     Family History   Problem Relation Age of Onset    Diabetes Father     Hypertension Father     Stroke Father        OBJECTIVE:    Visit Vitals  BP (!) 140/78   Pulse 77   Temp 97.5 °F (36.4 °C) (Oral)   Resp 20   Ht 5' 7\" (1.702 m)   Wt 255 lb 14.4 oz (116.1 kg)   SpO2 96%   BMI 40.08 kg/m²     CONSTITUTIONAL: well , well nourished, appears age appropriate  EYES: perrla, eom intact  ENMT:moist mucous membranes, pharynx clear  NECK: supple. Thyroid normal  RESPIRATORY: Chest: clear bilaterally   CARDIOVASCULAR: Heart: regular rate and rhythm  GASTROINTESTINAL: Abdomen: soft, bowel sounds active  HEMATOLOGIC: no pathological lymph nodes palpated  MUSCULOSKELETAL: Extremities: no edema, pulse 1+   INTEGUMENT: No unusual rashes or suspicious skin lesions noted. Nails appear normal.  NEUROLOGIC: non-focal exam   MENTAL STATUS: alert and oriented, appropriate affect           ASSESSMENT:  1. Primary hypertension    2. Venous insufficiency    3. Chronic midline low back pain without sciatica    4. Morbid obesity with BMI of 40.0-44.9, adult ([address]    5. Rectal bleeding    6. Chronic bilateral low back pain without sciatica    7. IGT (impaired glucose tolerance)      Repeat blood pressure is 140/78, which is acceptable. We ask her to check her blood pressure at home to make sure it is less than 130/80, last time it was in the 120s, so I am encouraging her to continue the medication. Venous insufficiency is not currently problematic. She has chronic low back pain, but without complaints today. Morbid obesity is a concern. She was previously severely overweight, now has graduated to morbid obesity and we encourage a heart healthy, weight reducing diet and physical activity 30 minutes five days a week. She will be back to see me in six months, sooner if she has any problems.     Will ask  Mustapha's opinion regarding her rectal bleeding. I have discussed the diagnosis with the patient and the intended plan as seen in the  Orders. The patient understands and agees with the plan. The patient has   received an after visit summary and questions were answered concerning  future plans  Patient labs and/or xrays were reviewed  Past records were reviewed. PLAN:  .  Orders Placed This Encounter    WILL MAMMO BI SCREENING INCL CAD    URINALYSIS W/ RFLX MICROSCOPIC    CBC WITH AUTOMATED DIFF    METABOLIC PANEL, COMPREHENSIVE    LIPID PANEL    HEMOGLOBIN A1C WITH EAG    VITAMIN B12    REFERRAL TO GASTROENTEROLOGY                    ATTENTION:   This medical record was transcribed using an electronic medical records system. Although proofread, it may and can contain electronic and spelling errors. Other human spelling and other errors may be present. Corrections may be executed at a later time. Please feel free to contact us for any clarifications as needed.

## 2022-08-03 LAB
ALBUMIN SERPL-MCNC: 3.6 G/DL (ref 3.5–5)
ALBUMIN/GLOB SERPL: 0.9 {RATIO} (ref 1.1–2.2)
ALP SERPL-CCNC: 74 U/L (ref 45–117)
ALT SERPL-CCNC: 29 U/L (ref 12–78)
ANION GAP SERPL CALC-SCNC: 6 MMOL/L (ref 5–15)
APPEARANCE UR: ABNORMAL
AST SERPL-CCNC: 22 U/L (ref 15–37)
BACTERIA URNS QL MICRO: ABNORMAL /HPF
BASOPHILS # BLD: 0.1 K/UL (ref 0–0.1)
BASOPHILS NFR BLD: 1 % (ref 0–1)
BILIRUB SERPL-MCNC: 0.4 MG/DL (ref 0.2–1)
BILIRUB UR QL: NEGATIVE
BUN SERPL-MCNC: 15 MG/DL (ref 6–20)
BUN/CREAT SERPL: 16 (ref 12–20)
CALCIUM SERPL-MCNC: 9.3 MG/DL (ref 8.5–10.1)
CHLORIDE SERPL-SCNC: 107 MMOL/L (ref 97–108)
CHOLEST SERPL-MCNC: 139 MG/DL
CO2 SERPL-SCNC: 30 MMOL/L (ref 21–32)
COLOR UR: ABNORMAL
CREAT SERPL-MCNC: 0.93 MG/DL (ref 0.55–1.02)
DIFFERENTIAL METHOD BLD: NORMAL
EOSINOPHIL # BLD: 0.2 K/UL (ref 0–0.4)
EOSINOPHIL NFR BLD: 3 % (ref 0–7)
EPITH CASTS URNS QL MICRO: ABNORMAL /LPF
ERYTHROCYTE [DISTWIDTH] IN BLOOD BY AUTOMATED COUNT: 14.5 % (ref 11.5–14.5)
EST. AVERAGE GLUCOSE BLD GHB EST-MCNC: 123 MG/DL
GLOBULIN SER CALC-MCNC: 4.1 G/DL (ref 2–4)
GLUCOSE SERPL-MCNC: 104 MG/DL (ref 65–100)
GLUCOSE UR STRIP.AUTO-MCNC: NEGATIVE MG/DL
HBA1C MFR BLD: 5.9 % (ref 4–5.6)
HCT VFR BLD AUTO: 37.4 % (ref 35–47)
HDLC SERPL-MCNC: 67 MG/DL
HDLC SERPL: 2.1 {RATIO} (ref 0–5)
HGB BLD-MCNC: 11.8 G/DL (ref 11.5–16)
HGB UR QL STRIP: NEGATIVE
HYALINE CASTS URNS QL MICRO: ABNORMAL /LPF (ref 0–5)
IMM GRANULOCYTES # BLD AUTO: 0 K/UL (ref 0–0.04)
IMM GRANULOCYTES NFR BLD AUTO: 0 % (ref 0–0.5)
KETONES UR QL STRIP.AUTO: NEGATIVE MG/DL
LDLC SERPL CALC-MCNC: 43 MG/DL (ref 0–100)
LEUKOCYTE ESTERASE UR QL STRIP.AUTO: ABNORMAL
LYMPHOCYTES # BLD: 2.1 K/UL (ref 0.8–3.5)
LYMPHOCYTES NFR BLD: 28 % (ref 12–49)
MCH RBC QN AUTO: 28.6 PG (ref 26–34)
MCHC RBC AUTO-ENTMCNC: 31.6 G/DL (ref 30–36.5)
MCV RBC AUTO: 90.8 FL (ref 80–99)
MONOCYTES # BLD: 0.9 K/UL (ref 0–1)
MONOCYTES NFR BLD: 12 % (ref 5–13)
NEUTS SEG # BLD: 4.2 K/UL (ref 1.8–8)
NEUTS SEG NFR BLD: 56 % (ref 32–75)
NITRITE UR QL STRIP.AUTO: NEGATIVE
NRBC # BLD: 0 K/UL (ref 0–0.01)
NRBC BLD-RTO: 0 PER 100 WBC
PH UR STRIP: 5.5 [PH] (ref 5–8)
PLATELET # BLD AUTO: 200 K/UL (ref 150–400)
PMV BLD AUTO: 11.7 FL (ref 8.9–12.9)
POTASSIUM SERPL-SCNC: 4.2 MMOL/L (ref 3.5–5.1)
PROT SERPL-MCNC: 7.7 G/DL (ref 6.4–8.2)
PROT UR STRIP-MCNC: NEGATIVE MG/DL
RBC # BLD AUTO: 4.12 M/UL (ref 3.8–5.2)
RBC #/AREA URNS HPF: ABNORMAL /HPF (ref 0–5)
SODIUM SERPL-SCNC: 143 MMOL/L (ref 136–145)
SP GR UR REFRACTOMETRY: 1.01 (ref 1–1.03)
TRIGL SERPL-MCNC: 145 MG/DL (ref ?–150)
UROBILINOGEN UR QL STRIP.AUTO: 0.2 EU/DL (ref 0.2–1)
VIT B12 SERPL-MCNC: 480 PG/ML (ref 193–986)
VLDLC SERPL CALC-MCNC: 29 MG/DL
WBC # BLD AUTO: 7.6 K/UL (ref 3.6–11)
WBC URNS QL MICRO: ABNORMAL /HPF (ref 0–4)

## 2022-09-02 ENCOUNTER — HOSPITAL ENCOUNTER (OUTPATIENT)
Dept: MAMMOGRAPHY | Age: 62
Discharge: HOME | End: 2022-09-02
Attending: INTERNAL MEDICINE
Payer: COMMERCIAL

## 2022-09-02 DIAGNOSIS — I10 PRIMARY HYPERTENSION: ICD-10-CM

## 2022-09-02 PROCEDURE — 77067 SCR MAMMO BI INCL CAD: CPT

## 2023-02-19 RX ORDER — METOPROLOL SUCCINATE 50 MG/1
TABLET, EXTENDED RELEASE ORAL
Qty: 90 TABLET | Refills: 3 | Status: SHIPPED | OUTPATIENT
Start: 2023-02-19

## 2023-03-24 RX ORDER — LANSOPRAZOLE 30 MG/1
CAPSULE, DELAYED RELEASE ORAL
Qty: 90 CAPSULE | Refills: 10 | Status: SHIPPED | OUTPATIENT
Start: 2023-03-24

## 2023-05-10 RX ORDER — ROSUVASTATIN CALCIUM 5 MG/1
TABLET, COATED ORAL
Qty: 90 TABLET | Refills: 3 | Status: SHIPPED | OUTPATIENT
Start: 2023-05-10

## 2023-05-30 RX ORDER — HYDROCORTISONE ACETATE 25 MG/1
25 SUPPOSITORY RECTAL EVERY 12 HOURS
COMMUNITY
Start: 2021-04-01

## 2023-05-30 RX ORDER — CETIRIZINE HYDROCHLORIDE 10 MG/1
10 TABLET ORAL DAILY PRN
COMMUNITY
Start: 2019-03-07

## 2023-05-30 RX ORDER — METOPROLOL SUCCINATE 50 MG/1
1 TABLET, EXTENDED RELEASE ORAL DAILY
COMMUNITY
Start: 2023-02-19

## 2023-05-30 RX ORDER — LANSOPRAZOLE 30 MG/1
CAPSULE, DELAYED RELEASE ORAL
COMMUNITY
Start: 2023-03-24

## 2024-02-11 RX ORDER — METOPROLOL SUCCINATE 50 MG/1
50 TABLET, EXTENDED RELEASE ORAL DAILY
Qty: 90 TABLET | Refills: 3 | OUTPATIENT
Start: 2024-02-11

## 2024-02-22 RX ORDER — METOPROLOL SUCCINATE 50 MG/1
50 TABLET, EXTENDED RELEASE ORAL DAILY
Qty: 90 TABLET | Refills: 3 | OUTPATIENT
Start: 2024-02-22

## 2024-02-23 RX ORDER — METOPROLOL SUCCINATE 50 MG/1
50 TABLET, EXTENDED RELEASE ORAL DAILY
Qty: 30 TABLET | Refills: 0 | Status: SHIPPED | OUTPATIENT
Start: 2024-02-23

## 2024-02-29 ENCOUNTER — OFFICE VISIT (OUTPATIENT)
Facility: CLINIC | Age: 64
End: 2024-02-29
Payer: COMMERCIAL

## 2024-02-29 VITALS
HEART RATE: 64 BPM | WEIGHT: 249.3 LBS | HEIGHT: 67 IN | OXYGEN SATURATION: 97 % | RESPIRATION RATE: 16 BRPM | DIASTOLIC BLOOD PRESSURE: 74 MMHG | BODY MASS INDEX: 39.13 KG/M2 | SYSTOLIC BLOOD PRESSURE: 137 MMHG | TEMPERATURE: 97.6 F

## 2024-02-29 DIAGNOSIS — I87.2 VENOUS INSUFFICIENCY: ICD-10-CM

## 2024-02-29 DIAGNOSIS — M54.50 CHRONIC BILATERAL LOW BACK PAIN WITHOUT SCIATICA: ICD-10-CM

## 2024-02-29 DIAGNOSIS — G89.29 CHRONIC BILATERAL LOW BACK PAIN WITHOUT SCIATICA: ICD-10-CM

## 2024-02-29 DIAGNOSIS — R73.02 IGT (IMPAIRED GLUCOSE TOLERANCE): ICD-10-CM

## 2024-02-29 DIAGNOSIS — I10 PRIMARY HYPERTENSION: Primary | ICD-10-CM

## 2024-02-29 DIAGNOSIS — E78.5 DYSLIPIDEMIA: ICD-10-CM

## 2024-02-29 DIAGNOSIS — M47.816 SPONDYLOSIS OF LUMBAR SPINE: ICD-10-CM

## 2024-02-29 PROBLEM — Z98.890 S/P COLONOSCOPY: Status: RESOLVED | Noted: 2017-03-28 | Resolved: 2024-02-29

## 2024-02-29 PROBLEM — E66.01 MORBID OBESITY WITH BMI OF 40.0-44.9, ADULT (HCC): Status: RESOLVED | Noted: 2022-08-02 | Resolved: 2024-02-29

## 2024-02-29 PROBLEM — M54.9 BACK PAIN: Status: RESOLVED | Noted: 2021-08-02 | Resolved: 2024-02-29

## 2024-02-29 PROBLEM — K26.9 DUODENAL ULCER: Status: RESOLVED | Noted: 2017-03-28 | Resolved: 2024-02-29

## 2024-02-29 LAB
ALBUMIN SERPL-MCNC: 3.7 G/DL (ref 3.5–5)
ALBUMIN/GLOB SERPL: 0.8 (ref 1.1–2.2)
ALP SERPL-CCNC: 96 U/L (ref 45–117)
ALT SERPL-CCNC: 27 U/L (ref 12–78)
ANION GAP SERPL CALC-SCNC: 0 MMOL/L (ref 5–15)
APPEARANCE UR: CLEAR
AST SERPL-CCNC: 28 U/L (ref 15–37)
BACTERIA URNS QL MICRO: ABNORMAL /HPF
BASOPHILS # BLD: 0.1 K/UL (ref 0–0.1)
BASOPHILS NFR BLD: 1 % (ref 0–1)
BILIRUB SERPL-MCNC: 0.5 MG/DL (ref 0.2–1)
BILIRUB UR QL: NEGATIVE
BUN SERPL-MCNC: 14 MG/DL (ref 6–20)
BUN/CREAT SERPL: 14 (ref 12–20)
CALCIUM SERPL-MCNC: 9.2 MG/DL (ref 8.5–10.1)
CHLORIDE SERPL-SCNC: 107 MMOL/L (ref 97–108)
CHOLEST SERPL-MCNC: 164 MG/DL
CO2 SERPL-SCNC: 32 MMOL/L (ref 21–32)
COLOR UR: ABNORMAL
CREAT SERPL-MCNC: 0.99 MG/DL (ref 0.55–1.02)
DIFFERENTIAL METHOD BLD: NORMAL
EOSINOPHIL # BLD: 0.2 K/UL (ref 0–0.4)
EOSINOPHIL NFR BLD: 4 % (ref 0–7)
EPITH CASTS URNS QL MICRO: ABNORMAL /LPF
ERYTHROCYTE [DISTWIDTH] IN BLOOD BY AUTOMATED COUNT: 14.2 % (ref 11.5–14.5)
EST. AVERAGE GLUCOSE BLD GHB EST-MCNC: 123 MG/DL
GLOBULIN SER CALC-MCNC: 4.7 G/DL (ref 2–4)
GLUCOSE SERPL-MCNC: 115 MG/DL (ref 65–100)
GLUCOSE UR STRIP.AUTO-MCNC: NEGATIVE MG/DL
HBA1C MFR BLD: 5.9 % (ref 4–5.6)
HCT VFR BLD AUTO: 38.5 % (ref 35–47)
HCV AB SER IA-ACNC: 0.13 INDEX
HCV AB SERPL QL IA: NONREACTIVE
HDLC SERPL-MCNC: 72 MG/DL
HDLC SERPL: 2.3 (ref 0–5)
HGB BLD-MCNC: 12.5 G/DL (ref 11.5–16)
HGB UR QL STRIP: NEGATIVE
HYALINE CASTS URNS QL MICRO: ABNORMAL /LPF (ref 0–5)
IMM GRANULOCYTES # BLD AUTO: 0 K/UL (ref 0–0.04)
IMM GRANULOCYTES NFR BLD AUTO: 0 % (ref 0–0.5)
KETONES UR QL STRIP.AUTO: NEGATIVE MG/DL
LDLC SERPL CALC-MCNC: 72.2 MG/DL (ref 0–100)
LEUKOCYTE ESTERASE UR QL STRIP.AUTO: ABNORMAL
LYMPHOCYTES # BLD: 1.8 K/UL (ref 0.8–3.5)
LYMPHOCYTES NFR BLD: 35 % (ref 12–49)
MCH RBC QN AUTO: 28.4 PG (ref 26–34)
MCHC RBC AUTO-ENTMCNC: 32.5 G/DL (ref 30–36.5)
MCV RBC AUTO: 87.5 FL (ref 80–99)
MONOCYTES # BLD: 0.7 K/UL (ref 0–1)
MONOCYTES NFR BLD: 13 % (ref 5–13)
NEUTS SEG # BLD: 2.4 K/UL (ref 1.8–8)
NEUTS SEG NFR BLD: 47 % (ref 32–75)
NITRITE UR QL STRIP.AUTO: POSITIVE
NRBC # BLD: 0 K/UL (ref 0–0.01)
NRBC BLD-RTO: 0 PER 100 WBC
PH UR STRIP: 5.5 (ref 5–8)
PLATELET # BLD AUTO: 202 K/UL (ref 150–400)
PMV BLD AUTO: 12.6 FL (ref 8.9–12.9)
POTASSIUM SERPL-SCNC: 4.2 MMOL/L (ref 3.5–5.1)
PROT SERPL-MCNC: 8.4 G/DL (ref 6.4–8.2)
PROT UR STRIP-MCNC: NEGATIVE MG/DL
RBC # BLD AUTO: 4.4 M/UL (ref 3.8–5.2)
RBC #/AREA URNS HPF: ABNORMAL /HPF (ref 0–5)
SODIUM SERPL-SCNC: 139 MMOL/L (ref 136–145)
SP GR UR REFRACTOMETRY: 1.01 (ref 1–1.03)
TRIGL SERPL-MCNC: 99 MG/DL
TSH SERPL DL<=0.05 MIU/L-ACNC: 1.09 UIU/ML (ref 0.36–3.74)
UROBILINOGEN UR QL STRIP.AUTO: 0.2 EU/DL (ref 0.2–1)
VLDLC SERPL CALC-MCNC: 19.8 MG/DL
WBC # BLD AUTO: 5.2 K/UL (ref 3.6–11)
WBC URNS QL MICRO: ABNORMAL /HPF (ref 0–4)

## 2024-02-29 PROCEDURE — 99214 OFFICE O/P EST MOD 30 MIN: CPT | Performed by: INTERNAL MEDICINE

## 2024-02-29 PROCEDURE — 3078F DIAST BP <80 MM HG: CPT | Performed by: INTERNAL MEDICINE

## 2024-02-29 PROCEDURE — 3075F SYST BP GE 130 - 139MM HG: CPT | Performed by: INTERNAL MEDICINE

## 2024-02-29 RX ORDER — METOPROLOL SUCCINATE 50 MG/1
50 TABLET, EXTENDED RELEASE ORAL DAILY
Qty: 90 TABLET | Refills: 3 | Status: SHIPPED | OUTPATIENT
Start: 2024-02-29

## 2024-02-29 RX ORDER — LANSOPRAZOLE 30 MG/1
CAPSULE, DELAYED RELEASE ORAL
Qty: 30 CAPSULE | Refills: 11 | Status: SHIPPED | OUTPATIENT
Start: 2024-02-29

## 2024-02-29 RX ORDER — METOPROLOL SUCCINATE 50 MG/1
50 TABLET, EXTENDED RELEASE ORAL DAILY
Qty: 30 TABLET | Refills: 11 | Status: SHIPPED | OUTPATIENT
Start: 2024-02-29 | End: 2024-02-29

## 2024-02-29 RX ORDER — FLUTICASONE PROPIONATE 50 MCG
2 SPRAY, SUSPENSION (ML) NASAL DAILY
Qty: 16 G | Refills: 11 | Status: SHIPPED | OUTPATIENT
Start: 2024-02-29

## 2024-02-29 SDOH — ECONOMIC STABILITY: HOUSING INSECURITY
IN THE LAST 12 MONTHS, WAS THERE A TIME WHEN YOU DID NOT HAVE A STEADY PLACE TO SLEEP OR SLEPT IN A SHELTER (INCLUDING NOW)?: NO

## 2024-02-29 SDOH — ECONOMIC STABILITY: FOOD INSECURITY: WITHIN THE PAST 12 MONTHS, THE FOOD YOU BOUGHT JUST DIDN'T LAST AND YOU DIDN'T HAVE MONEY TO GET MORE.: NEVER TRUE

## 2024-02-29 SDOH — ECONOMIC STABILITY: INCOME INSECURITY: HOW HARD IS IT FOR YOU TO PAY FOR THE VERY BASICS LIKE FOOD, HOUSING, MEDICAL CARE, AND HEATING?: NOT HARD AT ALL

## 2024-02-29 SDOH — ECONOMIC STABILITY: FOOD INSECURITY: WITHIN THE PAST 12 MONTHS, YOU WORRIED THAT YOUR FOOD WOULD RUN OUT BEFORE YOU GOT MONEY TO BUY MORE.: NEVER TRUE

## 2024-02-29 ASSESSMENT — PATIENT HEALTH QUESTIONNAIRE - PHQ9
7. TROUBLE CONCENTRATING ON THINGS, SUCH AS READING THE NEWSPAPER OR WATCHING TELEVISION: 1
SUM OF ALL RESPONSES TO PHQ QUESTIONS 1-9: 10
6. FEELING BAD ABOUT YOURSELF - OR THAT YOU ARE A FAILURE OR HAVE LET YOURSELF OR YOUR FAMILY DOWN: 1
SUM OF ALL RESPONSES TO PHQ QUESTIONS 1-9: 10
9. THOUGHTS THAT YOU WOULD BE BETTER OFF DEAD, OR OF HURTING YOURSELF: 0
2. FEELING DOWN, DEPRESSED OR HOPELESS: 2
3. TROUBLE FALLING OR STAYING ASLEEP: 1
10. IF YOU CHECKED OFF ANY PROBLEMS, HOW DIFFICULT HAVE THESE PROBLEMS MADE IT FOR YOU TO DO YOUR WORK, TAKE CARE OF THINGS AT HOME, OR GET ALONG WITH OTHER PEOPLE: 1
8. MOVING OR SPEAKING SO SLOWLY THAT OTHER PEOPLE COULD HAVE NOTICED. OR THE OPPOSITE, BEING SO FIGETY OR RESTLESS THAT YOU HAVE BEEN MOVING AROUND A LOT MORE THAN USUAL: 0
SUM OF ALL RESPONSES TO PHQ QUESTIONS 1-9: 10
SUM OF ALL RESPONSES TO PHQ QUESTIONS 1-9: 10
1. LITTLE INTEREST OR PLEASURE IN DOING THINGS: 3
4. FEELING TIRED OR HAVING LITTLE ENERGY: 1
SUM OF ALL RESPONSES TO PHQ9 QUESTIONS 1 & 2: 5
5. POOR APPETITE OR OVEREATING: 1

## 2024-02-29 ASSESSMENT — ANXIETY QUESTIONNAIRES
3. WORRYING TOO MUCH ABOUT DIFFERENT THINGS: 0
GAD7 TOTAL SCORE: 0
6. BECOMING EASILY ANNOYED OR IRRITABLE: 0
5. BEING SO RESTLESS THAT IT IS HARD TO SIT STILL: 0
IF YOU CHECKED OFF ANY PROBLEMS ON THIS QUESTIONNAIRE, HOW DIFFICULT HAVE THESE PROBLEMS MADE IT FOR YOU TO DO YOUR WORK, TAKE CARE OF THINGS AT HOME, OR GET ALONG WITH OTHER PEOPLE: NOT DIFFICULT AT ALL
2. NOT BEING ABLE TO STOP OR CONTROL WORRYING: 0
4. TROUBLE RELAXING: 0
7. FEELING AFRAID AS IF SOMETHING AWFUL MIGHT HAPPEN: 0
1. FEELING NERVOUS, ANXIOUS, OR ON EDGE: 0

## 2024-02-29 NOTE — PROGRESS NOTES
Chief Complaint   Patient presents with    Follow-up    Hypertension     \"Have you been to the ER, urgent care clinic since your last visit?  Hospitalized since your last visit?\"    NO    “Have you seen or consulted any other health care providers outside of [facility] since your last visit?”    NO        “Have you had a pap smear?”    NO      
    ASSESSMENT:  1. Primary hypertension    2. IGT (impaired glucose tolerance)    3. Chronic bilateral low back pain without sciatica    4. Spondylosis of lumbar spine    5. Venous insufficiency    6. Dyslipidemia      Blood pressure control is adequate.  No titration is needed.      She has impaired glucose tolerance and we will check hemoglobin A1c.    Chronic low back pain is not particularly symptomatic related to spondylosis of the lumbar spine.    Chronic venous insufficiency is asymptomatic.    Dyslipidemia will be evaluated with a lipid panel today.  We will report to her the results.    She was morbidly obese.  She is no longer morbidly obese, but severely obese, and we congratulate her for her weight loss.  She would like to try one of the weight loss medications, but unfortunately, they are not reimbursable.    She will be back to see me in about a year, sooner if she has any problems.        I have discussed the diagnosis with the patient and the intended plan as seen in the  Orders.  The patient understands and agees with the plan.  The patient has   received an after visit summary and questions were answered concerning  future plans  Patient labs and/or xrays were reviewed  Past records were reviewed.    PLAN:  Orders Placed This Encounter   Procedures    [facility] DIGITAL SCREEN BILATERAL     Standing Status:   Future     Standing Expiration Date:   4/29/2025    TSH     Standing Status:   Future     Number of Occurrences:   1     Standing Expiration Date:   2/28/2025    Urinalysis with Microscopic     Standing Status:   Future     Number of Occurrences:   1     Standing Expiration Date:   2/28/2025     Order Specific Question:   SPECIFY(EX-CATH,MIDSTREAM,CYSTO,ETC)?     Answer:   ms    Lipid Panel     Standing Status:   Future     Number of Occurrences:   1     Standing Expiration Date:   2/28/2025    Comprehensive Metabolic Panel    CBC with Auto Differential     Standing Status:   Future     Number

## 2024-05-18 RX ORDER — ROSUVASTATIN CALCIUM 5 MG/1
TABLET, COATED ORAL
Qty: 90 TABLET | Refills: 3 | Status: SHIPPED | OUTPATIENT
Start: 2024-05-18

## 2025-02-28 SDOH — ECONOMIC STABILITY: INCOME INSECURITY: IN THE LAST 12 MONTHS, WAS THERE A TIME WHEN YOU WERE NOT ABLE TO PAY THE MORTGAGE OR RENT ON TIME?: NO

## 2025-02-28 SDOH — ECONOMIC STABILITY: TRANSPORTATION INSECURITY
IN THE PAST 12 MONTHS, HAS LACK OF TRANSPORTATION KEPT YOU FROM MEETINGS, WORK, OR FROM GETTING THINGS NEEDED FOR DAILY LIVING?: NO

## 2025-02-28 SDOH — ECONOMIC STABILITY: FOOD INSECURITY: WITHIN THE PAST 12 MONTHS, THE FOOD YOU BOUGHT JUST DIDN'T LAST AND YOU DIDN'T HAVE MONEY TO GET MORE.: SOMETIMES TRUE

## 2025-02-28 SDOH — ECONOMIC STABILITY: FOOD INSECURITY: WITHIN THE PAST 12 MONTHS, YOU WORRIED THAT YOUR FOOD WOULD RUN OUT BEFORE YOU GOT MONEY TO BUY MORE.: SOMETIMES TRUE

## 2025-02-28 SDOH — ECONOMIC STABILITY: TRANSPORTATION INSECURITY
IN THE PAST 12 MONTHS, HAS THE LACK OF TRANSPORTATION KEPT YOU FROM MEDICAL APPOINTMENTS OR FROM GETTING MEDICATIONS?: NO

## 2025-03-01 RX ORDER — METOPROLOL SUCCINATE 50 MG/1
50 TABLET, EXTENDED RELEASE ORAL DAILY
Qty: 90 TABLET | Refills: 3 | Status: SHIPPED | OUTPATIENT
Start: 2025-03-01

## 2025-03-03 ENCOUNTER — OFFICE VISIT (OUTPATIENT)
Facility: CLINIC | Age: 65
End: 2025-03-03
Payer: COMMERCIAL

## 2025-03-03 VITALS
OXYGEN SATURATION: 96 % | SYSTOLIC BLOOD PRESSURE: 126 MMHG | RESPIRATION RATE: 16 BRPM | DIASTOLIC BLOOD PRESSURE: 80 MMHG | BODY MASS INDEX: 37.95 KG/M2 | HEIGHT: 67 IN | WEIGHT: 241.8 LBS | TEMPERATURE: 97.9 F | HEART RATE: 71 BPM

## 2025-03-03 DIAGNOSIS — G89.29 CHRONIC RIGHT-SIDED LOW BACK PAIN WITH RIGHT-SIDED SCIATICA: ICD-10-CM

## 2025-03-03 DIAGNOSIS — E78.5 DYSLIPIDEMIA: ICD-10-CM

## 2025-03-03 DIAGNOSIS — M54.41 CHRONIC RIGHT-SIDED LOW BACK PAIN WITH RIGHT-SIDED SCIATICA: ICD-10-CM

## 2025-03-03 DIAGNOSIS — I10 PRIMARY HYPERTENSION: ICD-10-CM

## 2025-03-03 DIAGNOSIS — G62.9 NEUROPATHY: Primary | ICD-10-CM

## 2025-03-03 DIAGNOSIS — M47.816 SPONDYLOSIS OF LUMBAR SPINE: ICD-10-CM

## 2025-03-03 DIAGNOSIS — Z00.00 PREVENTATIVE HEALTH CARE: Primary | ICD-10-CM

## 2025-03-03 PROBLEM — E66.9 OBESITY (BMI 30-39.9): Status: RESOLVED | Noted: 2022-08-02 | Resolved: 2024-02-29

## 2025-03-03 LAB
ALBUMIN SERPL-MCNC: 3.6 G/DL (ref 3.5–5)
ALBUMIN/GLOB SERPL: 0.8 (ref 1.1–2.2)
ALP SERPL-CCNC: 71 U/L (ref 45–117)
ALT SERPL-CCNC: 30 U/L (ref 12–78)
ANION GAP SERPL CALC-SCNC: 5 MMOL/L (ref 2–12)
APPEARANCE UR: CLEAR
AST SERPL-CCNC: 27 U/L (ref 15–37)
BACTERIA URNS QL MICRO: ABNORMAL /HPF
BASOPHILS # BLD: 0.04 K/UL (ref 0–0.1)
BASOPHILS NFR BLD: 0.8 % (ref 0–1)
BILIRUB SERPL-MCNC: 0.8 MG/DL (ref 0.2–1)
BILIRUB UR QL: NEGATIVE
BUN SERPL-MCNC: 12 MG/DL (ref 6–20)
BUN/CREAT SERPL: 13 (ref 12–20)
CALCIUM SERPL-MCNC: 9.6 MG/DL (ref 8.5–10.1)
CHLORIDE SERPL-SCNC: 103 MMOL/L (ref 97–108)
CHOLEST SERPL-MCNC: 143 MG/DL
CO2 SERPL-SCNC: 29 MMOL/L (ref 21–32)
COLOR UR: ABNORMAL
CREAT SERPL-MCNC: 0.95 MG/DL (ref 0.55–1.02)
DIFFERENTIAL METHOD BLD: ABNORMAL
EOSINOPHIL # BLD: 0.13 K/UL (ref 0–0.4)
EOSINOPHIL NFR BLD: 2.5 % (ref 0–7)
EPITH CASTS URNS QL MICRO: ABNORMAL /LPF
ERYTHROCYTE [DISTWIDTH] IN BLOOD BY AUTOMATED COUNT: 16.5 % (ref 11.5–14.5)
GLOBULIN SER CALC-MCNC: 4.7 G/DL (ref 2–4)
GLUCOSE SERPL-MCNC: 108 MG/DL (ref 65–100)
GLUCOSE UR STRIP.AUTO-MCNC: NEGATIVE MG/DL
HCT VFR BLD AUTO: 36.6 % (ref 35–47)
HDLC SERPL-MCNC: 72 MG/DL
HDLC SERPL: 2 (ref 0–5)
HGB BLD-MCNC: 11.9 G/DL (ref 11.5–16)
HGB UR QL STRIP: ABNORMAL
HYALINE CASTS URNS QL MICRO: ABNORMAL /LPF (ref 0–5)
IMM GRANULOCYTES # BLD AUTO: 0.02 K/UL (ref 0–0.04)
IMM GRANULOCYTES NFR BLD AUTO: 0.4 % (ref 0–0.5)
KETONES UR QL STRIP.AUTO: ABNORMAL MG/DL
LDLC SERPL CALC-MCNC: 50.4 MG/DL (ref 0–100)
LEUKOCYTE ESTERASE UR QL STRIP.AUTO: ABNORMAL
LYMPHOCYTES # BLD: 1.49 K/UL (ref 0.8–3.5)
LYMPHOCYTES NFR BLD: 28.7 % (ref 12–49)
MCH RBC QN AUTO: 26.4 PG (ref 26–34)
MCHC RBC AUTO-ENTMCNC: 32.5 G/DL (ref 30–36.5)
MCV RBC AUTO: 81.2 FL (ref 80–99)
MONOCYTES # BLD: 0.67 K/UL (ref 0–1)
MONOCYTES NFR BLD: 12.9 % (ref 5–13)
NEUTS SEG # BLD: 2.85 K/UL (ref 1.8–8)
NEUTS SEG NFR BLD: 54.7 % (ref 32–75)
NITRITE UR QL STRIP.AUTO: POSITIVE
NRBC # BLD: 0 K/UL (ref 0–0.01)
NRBC BLD-RTO: 0 PER 100 WBC
PH UR STRIP: 5.5 (ref 5–8)
PLATELET # BLD AUTO: 204 K/UL (ref 150–400)
PMV BLD AUTO: 11.8 FL (ref 8.9–12.9)
POTASSIUM SERPL-SCNC: 3.9 MMOL/L (ref 3.5–5.1)
PROT SERPL-MCNC: 8.3 G/DL (ref 6.4–8.2)
PROT UR STRIP-MCNC: ABNORMAL MG/DL
RBC # BLD AUTO: 4.51 M/UL (ref 3.8–5.2)
RBC #/AREA URNS HPF: ABNORMAL /HPF (ref 0–5)
SODIUM SERPL-SCNC: 137 MMOL/L (ref 136–145)
SP GR UR REFRACTOMETRY: 1.01 (ref 1–1.03)
TRIGL SERPL-MCNC: 103 MG/DL
TSH SERPL DL<=0.05 MIU/L-ACNC: 1.11 UIU/ML (ref 0.36–3.74)
UROBILINOGEN UR QL STRIP.AUTO: 0.2 EU/DL (ref 0.2–1)
VLDLC SERPL CALC-MCNC: 20.6 MG/DL
WBC # BLD AUTO: 5.2 K/UL (ref 3.6–11)
WBC URNS QL MICRO: ABNORMAL /HPF (ref 0–4)

## 2025-03-03 PROCEDURE — 99396 PREV VISIT EST AGE 40-64: CPT | Performed by: INTERNAL MEDICINE

## 2025-03-03 PROCEDURE — 3074F SYST BP LT 130 MM HG: CPT | Performed by: INTERNAL MEDICINE

## 2025-03-03 PROCEDURE — 3079F DIAST BP 80-89 MM HG: CPT | Performed by: INTERNAL MEDICINE

## 2025-03-03 RX ORDER — METOPROLOL TARTRATE 25 MG/1
25 TABLET, FILM COATED ORAL 2 TIMES DAILY
Qty: 180 TABLET | Refills: 3 | Status: SHIPPED | OUTPATIENT
Start: 2025-03-03

## 2025-03-03 RX ORDER — PREDNISONE 20 MG/1
60 TABLET ORAL DAILY
Qty: 20 TABLET | Refills: 0 | Status: SHIPPED | OUTPATIENT
Start: 2025-03-03

## 2025-03-03 ASSESSMENT — PATIENT HEALTH QUESTIONNAIRE - PHQ9
3. TROUBLE FALLING OR STAYING ASLEEP: NOT AT ALL
SUM OF ALL RESPONSES TO PHQ QUESTIONS 1-9: 0
SUM OF ALL RESPONSES TO PHQ QUESTIONS 1-9: 0
5. POOR APPETITE OR OVEREATING: NOT AT ALL
SUM OF ALL RESPONSES TO PHQ QUESTIONS 1-9: 0
10. IF YOU CHECKED OFF ANY PROBLEMS, HOW DIFFICULT HAVE THESE PROBLEMS MADE IT FOR YOU TO DO YOUR WORK, TAKE CARE OF THINGS AT HOME, OR GET ALONG WITH OTHER PEOPLE: NOT DIFFICULT AT ALL
8. MOVING OR SPEAKING SO SLOWLY THAT OTHER PEOPLE COULD HAVE NOTICED. OR THE OPPOSITE, BEING SO FIGETY OR RESTLESS THAT YOU HAVE BEEN MOVING AROUND A LOT MORE THAN USUAL: NOT AT ALL
SUM OF ALL RESPONSES TO PHQ QUESTIONS 1-9: 0
1. LITTLE INTEREST OR PLEASURE IN DOING THINGS: NOT AT ALL
9. THOUGHTS THAT YOU WOULD BE BETTER OFF DEAD, OR OF HURTING YOURSELF: NOT AT ALL
4. FEELING TIRED OR HAVING LITTLE ENERGY: NOT AT ALL
2. FEELING DOWN, DEPRESSED OR HOPELESS: NOT AT ALL
7. TROUBLE CONCENTRATING ON THINGS, SUCH AS READING THE NEWSPAPER OR WATCHING TELEVISION: NOT AT ALL
6. FEELING BAD ABOUT YOURSELF - OR THAT YOU ARE A FAILURE OR HAVE LET YOURSELF OR YOUR FAMILY DOWN: NOT AT ALL

## 2025-03-03 ASSESSMENT — ANXIETY QUESTIONNAIRES
GAD7 TOTAL SCORE: 0
7. FEELING AFRAID AS IF SOMETHING AWFUL MIGHT HAPPEN: NOT AT ALL
3. WORRYING TOO MUCH ABOUT DIFFERENT THINGS: NOT AT ALL
IF YOU CHECKED OFF ANY PROBLEMS ON THIS QUESTIONNAIRE, HOW DIFFICULT HAVE THESE PROBLEMS MADE IT FOR YOU TO DO YOUR WORK, TAKE CARE OF THINGS AT HOME, OR GET ALONG WITH OTHER PEOPLE: NOT DIFFICULT AT ALL
6. BECOMING EASILY ANNOYED OR IRRITABLE: NOT AT ALL
5. BEING SO RESTLESS THAT IT IS HARD TO SIT STILL: NOT AT ALL
1. FEELING NERVOUS, ANXIOUS, OR ON EDGE: NOT AT ALL
2. NOT BEING ABLE TO STOP OR CONTROL WORRYING: NOT AT ALL
4. TROUBLE RELAXING: NOT AT ALL

## 2025-03-03 NOTE — PROGRESS NOTES
Chief Complaint   Patient presents with    Annual Exam     \"Have you been to the ER, urgent care clinic since your last visit?  Hospitalized since your last visit?\"    NO    “Have you seen or consulted any other health care providers outside our system since your last visit?”    NO    Have you had a mammogram?”   NO    Date of last Mammogram: 9/2/2022      “Have you had a pap smear?”    NO    No cervical cancer screening on file              
nasal spray to be used.    Follow-up  - Follow up in 1 year or sooner if any complications arise.     1. Preventative health care    2. Primary hypertension    3. Chronic right-sided low back pain with right-sided sciatica    4. Spondylosis of lumbar spine    5. Dyslipidemia        I have discussed the diagnosis with the patient and the intended plan as seen in the  Orders.  The patient understands and agees with the plan.  The patient has   received an after visit summary and questions were answered concerning  future plans  Patient labs and/or xrays were reviewed  Past records were reviewed.    PLAN:  Orders Placed This Encounter   Procedures    [facility] DIGITAL SCREEN BILATERAL     Standing Status:   Future     Standing Expiration Date:   5/3/2026    TSH     Standing Status:   Future     Number of Occurrences:   1     Standing Expiration Date:   3/3/2026    Urinalysis with Microscopic     Standing Status:   Future     Number of Occurrences:   1     Standing Expiration Date:   3/3/2026     Order Specific Question:   SPECIFY(EX-CATH,MIDSTREAM,CYSTO,ETC)?     Answer:   ms    Lipid Panel     Standing Status:   Future     Number of Occurrences:   1     Standing Expiration Date:   3/3/2026    Comprehensive Metabolic Panel    CBC with Auto Differential     Standing Status:   Future     Number of Occurrences:   1     Standing Expiration Date:   3/3/2026        Follow-up and Dispositions    Return in about 1 year (around 3/3/2026).                ATTENTION:   This medical record was transcribed using an electronic medical records system.  Although proofread, it may and can contain electronic and spelling errors.  Other human spelling and other errors may be present.  Corrections may be executed at a later time.  Please feel free to contact us for any clarifications as needed.

## 2025-03-04 ENCOUNTER — TELEPHONE (OUTPATIENT)
Facility: CLINIC | Age: 65
End: 2025-03-04

## 2025-03-04 NOTE — TELEPHONE ENCOUNTER
LVM for patient to return call.   Dr. Diaz want patient to return for additional blood work. (Gammopathy)

## 2025-03-04 NOTE — TELEPHONE ENCOUNTER
----- Message from Dr. Andre Diaz MD sent at 3/3/2025  5:39 PM EST -----  Advised patient to make an appointment for additional laboratory studies

## 2025-03-05 PROBLEM — B96.20 ESCHERICHIA COLI URINARY TRACT INFECTION: Status: ACTIVE | Noted: 2025-03-05

## 2025-03-05 PROBLEM — N39.0 ESCHERICHIA COLI URINARY TRACT INFECTION: Status: ACTIVE | Noted: 2025-03-05

## 2025-03-05 LAB
BACTERIA SPEC CULT: ABNORMAL
CC UR VC: ABNORMAL
SERVICE CMNT-IMP: ABNORMAL

## 2025-03-05 RX ORDER — CIPROFLOXACIN 500 MG/1
500 TABLET, FILM COATED ORAL 2 TIMES DAILY
Qty: 10 TABLET | Refills: 0 | Status: SHIPPED | OUTPATIENT
Start: 2025-03-05

## 2025-03-06 ENCOUNTER — TELEPHONE (OUTPATIENT)
Facility: CLINIC | Age: 65
End: 2025-03-06

## 2025-03-06 NOTE — TELEPHONE ENCOUNTER
----- Message from Dr. Andre Diaz MD sent at 3/5/2025  4:45 PM EST -----  Advised of UTI and prescription at pharmacy.  Cipro

## 2025-03-07 ENCOUNTER — NURSE ONLY (OUTPATIENT)
Facility: CLINIC | Age: 65
End: 2025-03-07

## 2025-03-07 DIAGNOSIS — I10 PRIMARY HYPERTENSION: ICD-10-CM

## 2025-03-07 DIAGNOSIS — G62.9 NEUROPATHY: ICD-10-CM

## 2025-03-10 ENCOUNTER — RESULTS FOLLOW-UP (OUTPATIENT)
Facility: CLINIC | Age: 65
End: 2025-03-10

## 2025-03-10 LAB
ALBUMIN SERPL ELPH-MCNC: 3.8 G/DL (ref 2.9–4.4)
ALBUMIN/GLOB SERPL: 0.9 (ref 0.7–1.7)
ALPHA1 GLOB SERPL ELPH-MCNC: 0.3 G/DL (ref 0–0.4)
ALPHA2 GLOB SERPL ELPH-MCNC: 0.7 G/DL (ref 0.4–1)
B-GLOBULIN SERPL ELPH-MCNC: 1.5 G/DL (ref 0.7–1.3)
GAMMA GLOB SERPL ELPH-MCNC: 1.8 G/DL (ref 0.4–1.8)
GLOBULIN SER-MCNC: 4.3 G/DL (ref 2.2–3.9)
IGA SERPL-MCNC: 553 MG/DL (ref 87–352)
IGG SERPL-MCNC: 2094 MG/DL (ref 586–1602)
IGM SERPL-MCNC: 75 MG/DL (ref 26–217)
INTERPRETATION SERPL IEP-IMP: ABNORMAL
KAPPA LC FREE SER-MCNC: 26.7 MG/L (ref 3.3–19.4)
KAPPA LC FREE/LAMBDA FREE SER: 1.79 (ref 0.26–1.65)
LAMBDA LC FREE SERPL-MCNC: 14.9 MG/L (ref 5.7–26.3)
M PROTEIN SERPL ELPH-MCNC: ABNORMAL G/DL
PROT SERPL-MCNC: 8.1 G/DL (ref 6–8.5)

## 2025-04-02 PROBLEM — Z00.00 PREVENTATIVE HEALTH CARE: Status: RESOLVED | Noted: 2025-03-03 | Resolved: 2025-04-02

## 2025-05-25 RX ORDER — ROSUVASTATIN CALCIUM 5 MG/1
5 TABLET, COATED ORAL NIGHTLY
Qty: 90 TABLET | Refills: 3 | Status: SHIPPED | OUTPATIENT
Start: 2025-05-25

## 2025-05-25 RX ORDER — LANSOPRAZOLE 30 MG/1
CAPSULE, DELAYED RELEASE ORAL
Qty: 90 CAPSULE | Refills: 3 | Status: SHIPPED | OUTPATIENT
Start: 2025-05-25

## (undated) DEVICE — NEEDLE HYPO 18GA L1.5IN PNK S STL HUB POLYPR SHLD REG BVL

## (undated) DEVICE — 1200 GUARD II KIT W/5MM TUBE W/O VAC TUBE: Brand: GUARDIAN

## (undated) DEVICE — Device

## (undated) DEVICE — KENDALL RADIOLUCENT FOAM MONITORING ELECTRODE RECTANGULAR SHAPE: Brand: KENDALL

## (undated) DEVICE — SYR 3ML LL TIP 1/10ML GRAD --

## (undated) DEVICE — (D)SYR 10ML 1/5ML GRAD NSAF -- PKGING CHANGE USE ITEM 338027

## (undated) DEVICE — FILTER IV 5UM L1.75IN FLX STRW FOR FLD ASPIR FR GLS AMP

## (undated) DEVICE — SOLIDIFIER MEDC 1200ML -- CONVERT TO 356117

## (undated) DEVICE — BAG SPEC BIOHZRD 10 X 10 IN --

## (undated) DEVICE — MEDI-VAC YANK SUCT HNDL W/TPRD BULBOUS TIP: Brand: CARDINAL HEALTH

## (undated) DEVICE — BLOCK BITE ENDOSCP AD 21 MM W/ DIL BLU LF DISP

## (undated) DEVICE — FORCEPS BX L160CM DIA8MM GRSP DISECT CUP TIP NONLOCKING ROT

## (undated) DEVICE — CATH IV AUTOGRD BC PNK 20GA 25 -- INSYTE

## (undated) DEVICE — SET ADMIN 16ML TBNG L100IN 2 Y INJ SITE IV PIGGY BK DISP

## (undated) DEVICE — BASIN EMESIS 500CC ROSE 250/CS 60/PLT: Brand: MEDEGEN MEDICAL PRODUCTS, LLC

## (undated) DEVICE — TOWEL 4 PLY TISS 19X30 SUE WHT

## (undated) DEVICE — CONTAINER SPEC 20 ML LID NEUT BUFF FORMALIN 10 % POLYPR STS